# Patient Record
Sex: FEMALE | Race: WHITE | NOT HISPANIC OR LATINO
[De-identification: names, ages, dates, MRNs, and addresses within clinical notes are randomized per-mention and may not be internally consistent; named-entity substitution may affect disease eponyms.]

---

## 2017-08-03 ENCOUNTER — RESULT REVIEW (OUTPATIENT)
Age: 52
End: 2017-08-03

## 2017-08-03 ENCOUNTER — TRANSCRIPTION ENCOUNTER (OUTPATIENT)
Age: 52
End: 2017-08-03

## 2017-08-03 ENCOUNTER — OUTPATIENT (OUTPATIENT)
Dept: OUTPATIENT SERVICES | Facility: HOSPITAL | Age: 52
LOS: 1 days | Discharge: HOME | End: 2017-08-03
Payer: MEDICAID

## 2017-08-03 VITALS
HEIGHT: 65 IN | TEMPERATURE: 98 F | SYSTOLIC BLOOD PRESSURE: 113 MMHG | DIASTOLIC BLOOD PRESSURE: 65 MMHG | RESPIRATION RATE: 18 BRPM | OXYGEN SATURATION: 97 % | HEART RATE: 86 BPM | WEIGHT: 173.06 LBS

## 2017-08-03 VITALS
HEART RATE: 88 BPM | SYSTOLIC BLOOD PRESSURE: 166 MMHG | OXYGEN SATURATION: 97 % | DIASTOLIC BLOOD PRESSURE: 79 MMHG | RESPIRATION RATE: 12 BRPM

## 2017-08-03 DIAGNOSIS — Z98.1 ARTHRODESIS STATUS: Chronic | ICD-10-CM

## 2017-08-03 DIAGNOSIS — Z98.890 OTHER SPECIFIED POSTPROCEDURAL STATES: Chronic | ICD-10-CM

## 2017-08-03 DIAGNOSIS — Z96.641 PRESENCE OF RIGHT ARTIFICIAL HIP JOINT: Chronic | ICD-10-CM

## 2017-08-03 LAB — HCG UR QL: NEGATIVE — SIGNIFICANT CHANGE UP

## 2017-08-03 PROCEDURE — 88304 TISSUE EXAM BY PATHOLOGIST: CPT | Mod: 26

## 2017-08-03 RX ORDER — HYDROMORPHONE HYDROCHLORIDE 2 MG/ML
0.5 INJECTION INTRAMUSCULAR; INTRAVENOUS; SUBCUTANEOUS
Qty: 0 | Refills: 0 | Status: DISCONTINUED | OUTPATIENT
Start: 2017-08-03 | End: 2017-08-03

## 2017-08-03 RX ORDER — FENTANYL CITRATE 50 UG/ML
25 INJECTION INTRAVENOUS
Qty: 0 | Refills: 0 | Status: DISCONTINUED | OUTPATIENT
Start: 2017-08-03 | End: 2017-08-03

## 2017-08-03 RX ORDER — SODIUM CHLORIDE 9 MG/ML
1000 INJECTION, SOLUTION INTRAVENOUS
Qty: 0 | Refills: 0 | Status: DISCONTINUED | OUTPATIENT
Start: 2017-08-03 | End: 2017-08-03

## 2017-08-03 RX ORDER — ACETAMINOPHEN 500 MG
1000 TABLET ORAL ONCE
Qty: 0 | Refills: 0 | Status: COMPLETED | OUTPATIENT
Start: 2017-08-03 | End: 2017-08-03

## 2017-08-03 RX ADMIN — Medication 400 MILLIGRAM(S): at 11:04

## 2017-08-03 RX ADMIN — HYDROMORPHONE HYDROCHLORIDE 0.5 MILLIGRAM(S): 2 INJECTION INTRAMUSCULAR; INTRAVENOUS; SUBCUTANEOUS at 11:02

## 2017-08-03 RX ADMIN — HYDROMORPHONE HYDROCHLORIDE 0.5 MILLIGRAM(S): 2 INJECTION INTRAMUSCULAR; INTRAVENOUS; SUBCUTANEOUS at 11:15

## 2017-08-03 RX ADMIN — SODIUM CHLORIDE 75 MILLILITER(S): 9 INJECTION, SOLUTION INTRAVENOUS at 11:06

## 2017-08-03 RX ADMIN — Medication 1000 MILLIGRAM(S): at 11:20

## 2017-08-03 NOTE — ASU DISCHARGE PLAN (ADULT/PEDIATRIC). - MEDICATION SUMMARY - MEDICATIONS TO TAKE
I will START or STAY ON the medications listed below when I get home from the hospital:    oxyCODONE 15 mg oral tablet  --  by mouth   -- Indication: For home medication per pmd, consult primary for recomendations    methadone 10 mg oral tablet  --  by mouth   -- Indication: For home medication per pmd, consult primary for recomendations    oxyCODONE-acetaminophen 5 mg-325 mg oral tablet  -- 1 tab(s) by mouth every 4 hours, As Needed -for severe pain MDD:6 tabs per day  -- Caution federal law prohibits the transfer of this drug to any person other  than the person for whom it was prescribed.  May cause drowsiness.  Alcohol may intensify this effect.  Use care when operating dangerous machinery.  This prescription cannot be refilled.  This product contains acetaminophen.  Do not use  with any other product containing acetaminophen to prevent possible liver damage.  Using more of this medication than prescribed may cause serious breathing problems.    -- Indication: For prn severe pain    Cozaar 100 mg oral tablet  -- 1 tab(s) by mouth once a day  -- Indication: For home medication per pmd, consult primary for recomendations    Zoloft 100 mg oral tablet  -- 1 tab(s) by mouth once a day  -- Indication: For home medication per pmd, consult primary for recomendations    ZyrTEC 10 mg oral tablet  -- 1 tab(s) by mouth once a day  -- Indication: For home medication per pmd, consult primary for recomendations    Xanax 2 mg oral tablet  -- 1 tab(s) by mouth once a day, As Needed  -- Indication: For home medication per pmd, consult primary for recomendations    Ventolin HFA 90 mcg/inh inhalation aerosol  -- 2 puff(s) inhaled 4 times a day, As Needed  -- Indication: For home medication per pmd, consult primary for recomendations    Wellbutrin  mg/24 hours oral tablet, extended release  -- 1 tab(s) by mouth every 24 hours  -- Indication: For home medication per pmd, consult primary for recomendations    Synthroid 150 mcg (0.15 mg) oral tablet  -- 1 tab(s) by mouth once a day  -- Indication: For home medication per pmd, consult primary for recomendations

## 2017-08-03 NOTE — ASU DISCHARGE PLAN (ADULT/PEDIATRIC). - ITEMS TO FOLLOWUP WITH YOUR PHYSICIAN'S
please call office for follow up appointment; please rest and ice affected shoulder; sling for comfort; keep dressing clean dry intact; can replace dressing in 72 hours, place band aid over incision sites.

## 2017-08-03 NOTE — ASU PATIENT PROFILE, ADULT - PMH
Anxiety    Arthritis    Asthma    B12 deficiency    Depression    DVT (deep venous thrombosis)  right leg approx 7 years ago  Herniated lumbar intervertebral disc    HTN (hypertension)    Hypothyroid

## 2017-08-04 LAB — SURGICAL PATHOLOGY FINAL REPORT - CH: SIGNIFICANT CHANGE UP

## 2017-08-07 DIAGNOSIS — J45.909 UNSPECIFIED ASTHMA, UNCOMPLICATED: ICD-10-CM

## 2017-08-07 DIAGNOSIS — F32.9 MAJOR DEPRESSIVE DISORDER, SINGLE EPISODE, UNSPECIFIED: ICD-10-CM

## 2017-08-07 DIAGNOSIS — M75.51 BURSITIS OF RIGHT SHOULDER: ICD-10-CM

## 2017-08-07 DIAGNOSIS — I10 ESSENTIAL (PRIMARY) HYPERTENSION: ICD-10-CM

## 2017-08-07 DIAGNOSIS — M75.41 IMPINGEMENT SYNDROME OF RIGHT SHOULDER: ICD-10-CM

## 2017-08-07 DIAGNOSIS — M25.511 PAIN IN RIGHT SHOULDER: ICD-10-CM

## 2017-08-07 DIAGNOSIS — M24.111 OTHER ARTICULAR CARTILAGE DISORDERS, RIGHT SHOULDER: ICD-10-CM

## 2017-08-07 DIAGNOSIS — M75.121 COMPLETE ROTATOR CUFF TEAR OR RUPTURE OF RIGHT SHOULDER, NOT SPECIFIED AS TRAUMATIC: ICD-10-CM

## 2018-04-03 ENCOUNTER — TRANSCRIPTION ENCOUNTER (OUTPATIENT)
Age: 53
End: 2018-04-03

## 2018-04-03 RX ORDER — CELECOXIB 200 MG/1
200 CAPSULE ORAL ONCE
Qty: 0 | Refills: 0 | Status: DISCONTINUED | OUTPATIENT
Start: 2018-04-04 | End: 2018-04-04

## 2018-04-03 RX ORDER — ACETAMINOPHEN 500 MG
650 TABLET ORAL ONCE
Qty: 0 | Refills: 0 | Status: DISCONTINUED | OUTPATIENT
Start: 2018-04-04 | End: 2018-04-04

## 2018-04-03 RX ORDER — OXYCODONE HYDROCHLORIDE 5 MG/1
10 TABLET ORAL ONCE
Qty: 0 | Refills: 0 | Status: DISCONTINUED | OUTPATIENT
Start: 2018-04-04 | End: 2018-04-04

## 2018-04-04 ENCOUNTER — RESULT REVIEW (OUTPATIENT)
Age: 53
End: 2018-04-04

## 2018-04-04 ENCOUNTER — INPATIENT (INPATIENT)
Facility: HOSPITAL | Age: 53
LOS: 1 days | Discharge: HOME HEALTH | End: 2018-04-06
Attending: ORTHOPAEDIC SURGERY | Admitting: ORTHOPAEDIC SURGERY
Payer: MEDICAID

## 2018-04-04 VITALS
HEIGHT: 65 IN | RESPIRATION RATE: 16 BRPM | HEART RATE: 98 BPM | WEIGHT: 173.06 LBS | TEMPERATURE: 98 F | SYSTOLIC BLOOD PRESSURE: 128 MMHG | DIASTOLIC BLOOD PRESSURE: 76 MMHG | OXYGEN SATURATION: 97 %

## 2018-04-04 DIAGNOSIS — Z98.1 ARTHRODESIS STATUS: Chronic | ICD-10-CM

## 2018-04-04 DIAGNOSIS — Z96.641 PRESENCE OF RIGHT ARTIFICIAL HIP JOINT: Chronic | ICD-10-CM

## 2018-04-04 DIAGNOSIS — Z98.890 OTHER SPECIFIED POSTPROCEDURAL STATES: Chronic | ICD-10-CM

## 2018-04-04 DIAGNOSIS — M19.90 UNSPECIFIED OSTEOARTHRITIS, UNSPECIFIED SITE: ICD-10-CM

## 2018-04-04 LAB
ANION GAP SERPL CALC-SCNC: 6 MMOL/L — SIGNIFICANT CHANGE UP (ref 5–17)
BLD GP AB SCN SERPL QL: SIGNIFICANT CHANGE UP
BUN SERPL-MCNC: 11 MG/DL — SIGNIFICANT CHANGE UP (ref 7–23)
CALCIUM SERPL-MCNC: 7.4 MG/DL — LOW (ref 8.5–10.1)
CHLORIDE SERPL-SCNC: 109 MMOL/L — HIGH (ref 96–108)
CO2 SERPL-SCNC: 25 MMOL/L — SIGNIFICANT CHANGE UP (ref 22–31)
CREAT SERPL-MCNC: 0.76 MG/DL — SIGNIFICANT CHANGE UP (ref 0.5–1.3)
GLUCOSE SERPL-MCNC: 94 MG/DL — SIGNIFICANT CHANGE UP (ref 70–99)
HCG SERPL-ACNC: <1 MIU/ML — SIGNIFICANT CHANGE UP
HCG UR QL: NEGATIVE — SIGNIFICANT CHANGE UP
HCT VFR BLD CALC: 31.6 % — LOW (ref 34.5–45)
HGB BLD-MCNC: 10.2 G/DL — LOW (ref 11.5–15.5)
MCHC RBC-ENTMCNC: 32.2 PG — SIGNIFICANT CHANGE UP (ref 27–34)
MCHC RBC-ENTMCNC: 32.3 GM/DL — SIGNIFICANT CHANGE UP (ref 32–36)
MCV RBC AUTO: 99.7 FL — SIGNIFICANT CHANGE UP (ref 80–100)
NRBC # BLD: 0 /100 WBCS — SIGNIFICANT CHANGE UP (ref 0–0)
PLATELET # BLD AUTO: 251 K/UL — SIGNIFICANT CHANGE UP (ref 150–400)
POTASSIUM SERPL-MCNC: 3.9 MMOL/L — SIGNIFICANT CHANGE UP (ref 3.5–5.3)
POTASSIUM SERPL-SCNC: 3.9 MMOL/L — SIGNIFICANT CHANGE UP (ref 3.5–5.3)
RBC # BLD: 3.17 M/UL — LOW (ref 3.8–5.2)
RBC # FLD: 14.2 % — SIGNIFICANT CHANGE UP (ref 10.3–14.5)
SODIUM SERPL-SCNC: 140 MMOL/L — SIGNIFICANT CHANGE UP (ref 135–145)
WBC # BLD: 13.65 K/UL — HIGH (ref 3.8–10.5)
WBC # FLD AUTO: 13.65 K/UL — HIGH (ref 3.8–10.5)

## 2018-04-04 PROCEDURE — 88311 DECALCIFY TISSUE: CPT | Mod: 26

## 2018-04-04 PROCEDURE — 88305 TISSUE EXAM BY PATHOLOGIST: CPT | Mod: 26

## 2018-04-04 PROCEDURE — 72170 X-RAY EXAM OF PELVIS: CPT | Mod: 26

## 2018-04-04 RX ORDER — ALBUTEROL 90 UG/1
2 AEROSOL, METERED ORAL EVERY 4 HOURS
Qty: 0 | Refills: 0 | Status: DISCONTINUED | OUTPATIENT
Start: 2018-04-04 | End: 2018-04-06

## 2018-04-04 RX ORDER — ALPRAZOLAM 0.25 MG
2 TABLET ORAL DAILY
Qty: 0 | Refills: 0 | Status: DISCONTINUED | OUTPATIENT
Start: 2018-04-04 | End: 2018-04-06

## 2018-04-04 RX ORDER — OXYCODONE HYDROCHLORIDE 5 MG/1
5 TABLET ORAL EVERY 4 HOURS
Qty: 0 | Refills: 0 | Status: DISCONTINUED | OUTPATIENT
Start: 2018-04-04 | End: 2018-04-05

## 2018-04-04 RX ORDER — OXYCODONE HYDROCHLORIDE 5 MG/1
0 TABLET ORAL
Qty: 0 | Refills: 0 | COMMUNITY

## 2018-04-04 RX ORDER — ACETAMINOPHEN 500 MG
1000 TABLET ORAL ONCE
Qty: 0 | Refills: 0 | Status: COMPLETED | OUTPATIENT
Start: 2018-04-04 | End: 2018-04-04

## 2018-04-04 RX ORDER — HYDROMORPHONE HYDROCHLORIDE 2 MG/ML
1 INJECTION INTRAMUSCULAR; INTRAVENOUS; SUBCUTANEOUS EVERY 4 HOURS
Qty: 0 | Refills: 0 | Status: DISCONTINUED | OUTPATIENT
Start: 2018-04-04 | End: 2018-04-04

## 2018-04-04 RX ORDER — LOSARTAN POTASSIUM 100 MG/1
100 TABLET, FILM COATED ORAL DAILY
Qty: 0 | Refills: 0 | Status: DISCONTINUED | OUTPATIENT
Start: 2018-04-04 | End: 2018-04-06

## 2018-04-04 RX ORDER — FERROUS SULFATE 325(65) MG
325 TABLET ORAL
Qty: 0 | Refills: 0 | Status: DISCONTINUED | OUTPATIENT
Start: 2018-04-04 | End: 2018-04-06

## 2018-04-04 RX ORDER — FAMOTIDINE 10 MG/ML
20 INJECTION INTRAVENOUS EVERY 12 HOURS
Qty: 0 | Refills: 0 | Status: DISCONTINUED | OUTPATIENT
Start: 2018-04-04 | End: 2018-04-06

## 2018-04-04 RX ORDER — ACETAMINOPHEN 500 MG
650 TABLET ORAL EVERY 6 HOURS
Qty: 0 | Refills: 0 | Status: DISCONTINUED | OUTPATIENT
Start: 2018-04-04 | End: 2018-04-06

## 2018-04-04 RX ORDER — INFLUENZA VIRUS VACCINE 15; 15; 15; 15 UG/.5ML; UG/.5ML; UG/.5ML; UG/.5ML
0.5 SUSPENSION INTRAMUSCULAR ONCE
Qty: 0 | Refills: 0 | Status: DISCONTINUED | OUTPATIENT
Start: 2018-04-04 | End: 2018-04-06

## 2018-04-04 RX ORDER — DIPHENHYDRAMINE HCL 50 MG
50 CAPSULE ORAL EVERY 4 HOURS
Qty: 0 | Refills: 0 | Status: DISCONTINUED | OUTPATIENT
Start: 2018-04-04 | End: 2018-04-06

## 2018-04-04 RX ORDER — SODIUM CHLORIDE 9 MG/ML
1000 INJECTION, SOLUTION INTRAVENOUS
Qty: 0 | Refills: 0 | Status: DISCONTINUED | OUTPATIENT
Start: 2018-04-04 | End: 2018-04-05

## 2018-04-04 RX ORDER — LORATADINE 10 MG/1
10 TABLET ORAL DAILY
Qty: 0 | Refills: 0 | Status: DISCONTINUED | OUTPATIENT
Start: 2018-04-04 | End: 2018-04-06

## 2018-04-04 RX ORDER — ASPIRIN/CALCIUM CARB/MAGNESIUM 324 MG
325 TABLET ORAL
Qty: 0 | Refills: 0 | Status: DISCONTINUED | OUTPATIENT
Start: 2018-04-05 | End: 2018-04-06

## 2018-04-04 RX ORDER — BUPROPION HYDROCHLORIDE 150 MG/1
300 TABLET, EXTENDED RELEASE ORAL DAILY
Qty: 0 | Refills: 0 | Status: DISCONTINUED | OUTPATIENT
Start: 2018-04-04 | End: 2018-04-06

## 2018-04-04 RX ORDER — DOCUSATE SODIUM 100 MG
100 CAPSULE ORAL THREE TIMES A DAY
Qty: 0 | Refills: 0 | Status: DISCONTINUED | OUTPATIENT
Start: 2018-04-04 | End: 2018-04-06

## 2018-04-04 RX ORDER — BENZOCAINE AND MENTHOL 5; 1 G/100ML; G/100ML
1 LIQUID ORAL
Qty: 0 | Refills: 0 | Status: DISCONTINUED | OUTPATIENT
Start: 2018-04-04 | End: 2018-04-06

## 2018-04-04 RX ORDER — SODIUM CHLORIDE 9 MG/ML
1000 INJECTION, SOLUTION INTRAVENOUS
Qty: 0 | Refills: 0 | Status: DISCONTINUED | OUTPATIENT
Start: 2018-04-04 | End: 2018-04-04

## 2018-04-04 RX ORDER — ASCORBIC ACID 60 MG
500 TABLET,CHEWABLE ORAL
Qty: 0 | Refills: 0 | Status: DISCONTINUED | OUTPATIENT
Start: 2018-04-04 | End: 2018-04-06

## 2018-04-04 RX ORDER — HYDROMORPHONE HYDROCHLORIDE 2 MG/ML
1.5 INJECTION INTRAMUSCULAR; INTRAVENOUS; SUBCUTANEOUS
Qty: 0 | Refills: 0 | Status: DISCONTINUED | OUTPATIENT
Start: 2018-04-04 | End: 2018-04-06

## 2018-04-04 RX ORDER — ZOLPIDEM TARTRATE 10 MG/1
5 TABLET ORAL AT BEDTIME
Qty: 0 | Refills: 0 | Status: DISCONTINUED | OUTPATIENT
Start: 2018-04-04 | End: 2018-04-06

## 2018-04-04 RX ORDER — SERTRALINE 25 MG/1
200 TABLET, FILM COATED ORAL AT BEDTIME
Qty: 0 | Refills: 0 | Status: DISCONTINUED | OUTPATIENT
Start: 2018-04-04 | End: 2018-04-06

## 2018-04-04 RX ORDER — VANCOMYCIN HCL 1 G
1000 VIAL (EA) INTRAVENOUS ONCE
Qty: 0 | Refills: 0 | Status: COMPLETED | OUTPATIENT
Start: 2018-04-04 | End: 2018-04-04

## 2018-04-04 RX ORDER — SENNA PLUS 8.6 MG/1
2 TABLET ORAL AT BEDTIME
Qty: 0 | Refills: 0 | Status: DISCONTINUED | OUTPATIENT
Start: 2018-04-04 | End: 2018-04-06

## 2018-04-04 RX ORDER — ONDANSETRON 8 MG/1
4 TABLET, FILM COATED ORAL EVERY 6 HOURS
Qty: 0 | Refills: 0 | Status: DISCONTINUED | OUTPATIENT
Start: 2018-04-04 | End: 2018-04-06

## 2018-04-04 RX ORDER — METHADONE HYDROCHLORIDE 40 MG/1
0 TABLET ORAL
Qty: 0 | Refills: 0 | COMMUNITY

## 2018-04-04 RX ORDER — OXYCODONE HYDROCHLORIDE 5 MG/1
10 TABLET ORAL EVERY 4 HOURS
Qty: 0 | Refills: 0 | Status: DISCONTINUED | OUTPATIENT
Start: 2018-04-04 | End: 2018-04-05

## 2018-04-04 RX ORDER — LEVOTHYROXINE SODIUM 125 MCG
150 TABLET ORAL DAILY
Qty: 0 | Refills: 0 | Status: DISCONTINUED | OUTPATIENT
Start: 2018-04-04 | End: 2018-04-06

## 2018-04-04 RX ORDER — HYDROMORPHONE HYDROCHLORIDE 2 MG/ML
1 INJECTION INTRAMUSCULAR; INTRAVENOUS; SUBCUTANEOUS ONCE
Qty: 0 | Refills: 0 | Status: DISCONTINUED | OUTPATIENT
Start: 2018-04-04 | End: 2018-04-04

## 2018-04-04 RX ORDER — FOLIC ACID 0.8 MG
1 TABLET ORAL DAILY
Qty: 0 | Refills: 0 | Status: DISCONTINUED | OUTPATIENT
Start: 2018-04-04 | End: 2018-04-06

## 2018-04-04 RX ORDER — OXYCODONE HYDROCHLORIDE 5 MG/1
1 TABLET ORAL
Qty: 0 | Refills: 0 | COMMUNITY

## 2018-04-04 RX ADMIN — HYDROMORPHONE HYDROCHLORIDE 1 MILLIGRAM(S): 2 INJECTION INTRAMUSCULAR; INTRAVENOUS; SUBCUTANEOUS at 10:59

## 2018-04-04 RX ADMIN — HYDROMORPHONE HYDROCHLORIDE 1 MILLIGRAM(S): 2 INJECTION INTRAMUSCULAR; INTRAVENOUS; SUBCUTANEOUS at 11:13

## 2018-04-04 RX ADMIN — HYDROMORPHONE HYDROCHLORIDE 1.5 MILLIGRAM(S): 2 INJECTION INTRAMUSCULAR; INTRAVENOUS; SUBCUTANEOUS at 17:20

## 2018-04-04 RX ADMIN — Medication 650 MILLIGRAM(S): at 07:20

## 2018-04-04 RX ADMIN — CELECOXIB 200 MILLIGRAM(S): 200 CAPSULE ORAL at 07:20

## 2018-04-04 RX ADMIN — HYDROMORPHONE HYDROCHLORIDE 1.5 MILLIGRAM(S): 2 INJECTION INTRAMUSCULAR; INTRAVENOUS; SUBCUTANEOUS at 16:41

## 2018-04-04 RX ADMIN — SODIUM CHLORIDE 75 MILLILITER(S): 9 INJECTION, SOLUTION INTRAVENOUS at 11:00

## 2018-04-04 RX ADMIN — OXYCODONE HYDROCHLORIDE 10 MILLIGRAM(S): 5 TABLET ORAL at 15:06

## 2018-04-04 RX ADMIN — HYDROMORPHONE HYDROCHLORIDE 1 MILLIGRAM(S): 2 INJECTION INTRAMUSCULAR; INTRAVENOUS; SUBCUTANEOUS at 13:59

## 2018-04-04 RX ADMIN — OXYCODONE HYDROCHLORIDE 10 MILLIGRAM(S): 5 TABLET ORAL at 16:00

## 2018-04-04 RX ADMIN — Medication 250 MILLIGRAM(S): at 13:16

## 2018-04-04 RX ADMIN — OXYCODONE HYDROCHLORIDE 10 MILLIGRAM(S): 5 TABLET ORAL at 20:45

## 2018-04-04 RX ADMIN — HYDROMORPHONE HYDROCHLORIDE 1.5 MILLIGRAM(S): 2 INJECTION INTRAMUSCULAR; INTRAVENOUS; SUBCUTANEOUS at 20:20

## 2018-04-04 RX ADMIN — OXYCODONE HYDROCHLORIDE 10 MILLIGRAM(S): 5 TABLET ORAL at 21:45

## 2018-04-04 RX ADMIN — Medication 2 MILLIGRAM(S): at 21:33

## 2018-04-04 RX ADMIN — Medication 400 MILLIGRAM(S): at 14:45

## 2018-04-04 RX ADMIN — HYDROMORPHONE HYDROCHLORIDE 1.5 MILLIGRAM(S): 2 INJECTION INTRAMUSCULAR; INTRAVENOUS; SUBCUTANEOUS at 19:31

## 2018-04-04 RX ADMIN — SERTRALINE 200 MILLIGRAM(S): 25 TABLET, FILM COATED ORAL at 23:53

## 2018-04-04 RX ADMIN — HYDROMORPHONE HYDROCHLORIDE 1 MILLIGRAM(S): 2 INJECTION INTRAMUSCULAR; INTRAVENOUS; SUBCUTANEOUS at 13:16

## 2018-04-04 NOTE — H&P ADULT - NSHPPHYSICALEXAM_GEN_ALL_CORE
· Constitutional	Well-developed, well nourished  · Respiratory - Breath Sounds equal & clear to percussion & auscultation, no accessory muscle use  · Cardiovascular - Regular rate & rhythm, normal S1, S2; no murmurs, gallops or rubs; no S3, S4  · Gastrointestinal - Soft, non-tender, no hepatosplenomegaly, normal bowel sounds  · Extremities - No cyanosis, clubbing or edema  · Vascular - Equal and normal pulses (carotid, femoral, dorsalis pedis)  · Neurological - Alert & oriented; no sensory, motor or coordination deficits, normal reflexes  · Skin - No lesions; no rash  · Musculoskeletal - decreased ROM due to pain; left shoulder  · Psychiatric -Affect and characteristics of appearance, verbalizations, behaviors are appropriate

## 2018-04-04 NOTE — PHYSICAL THERAPY INITIAL EVALUATION ADULT - PASSIVE RANGE OF MOTION EXAMINATION, REHAB EVAL
Right LE Passive ROM was WNL (within normal limits)/L hip no adduction past neutral, no internal rotation, no flexion past 90

## 2018-04-04 NOTE — PHYSICAL THERAPY INITIAL EVALUATION ADULT - ADDITIONAL COMMENTS
Pt had right hip 4-5 years ago. Pt lives in an apartment with 1 step To enter building no handrails. Pt states her boyfriend will help assist. Pt's boyfriend will help once pt is home on and off when he is not at work.

## 2018-04-04 NOTE — PHYSICAL THERAPY INITIAL EVALUATION ADULT - GAIT DEVIATIONS NOTED, PT EVAL
decreased step length/increased time in double stance/decreased stride length/decreased weight-shifting ability/decreased epifanio

## 2018-04-04 NOTE — PHYSICAL THERAPY INITIAL EVALUATION ADULT - MODIFIED CLINICAL TEST OF SENSORY INTEGRATION IN BALANCE TEST
Barthel Index: Feeding Score _10__, Bathing Score _0__, Grooming Score _5__, Dressing Score __5_, Bowels Score _10__, Bladder Score 10___, Toilet Score _5_, Transfers Score __5_, Mobility Score _0__, Stairs Score _0__,     Total Score __50_

## 2018-04-04 NOTE — H&P ADULT - HISTORY OF PRESENT ILLNESS
52 year old female c/o left hip pain. Failed outpatient conservative management, now presenting for left total hip arthroplasty.

## 2018-04-04 NOTE — ASU PREOP CHECKLIST - MUPIRONCIN COMMENTS
hibiclens not available since patient did not come for presurgical hibiclens not available since patient did not come for presurgical - pt took mupirocin for 5 days

## 2018-04-04 NOTE — H&P ADULT - NSHPREVIEWOFSYSTEMS_GEN_ALL_CORE
CONSTITUTIONAL: No fever or chills  HEENT:  No headache, no sore throat  RESPIRATORY: No cough, wheezing, or shortness of breath  CARDIOVASCULAR: No chest pain, palpitations, or leg swelling  GASTROINTESTINAL: No nausea, vomiting, or diarrhea  GENITOURINARY: No dysuria, frequency, or hematuria  NEUROLOGICAL: no focal weakness or dizziness  SKIN:  No rashes or lesions   MUSCULOSKELETAL: no myalgias   PSYCHIATRIC: No depression or anxiety

## 2018-04-04 NOTE — PROGRESS NOTE ADULT - SUBJECTIVE AND OBJECTIVE BOX
Post-Op Check:    Pt S/E at bedside, tolerated procedure well. Pt complaining of pain, worse at L hip but also present in back and BL shoulders as pt has Hx of multiple orthopedic surgeries and on daily opioids at home.    Vital Signs Last 24 Hrs  T(C): 36.4 (04 Apr 2018 12:37), Max: 36.9 (04 Apr 2018 06:48)  T(F): 97.6 (04 Apr 2018 12:37), Max: 98.5 (04 Apr 2018 11:39)  HR: 93 (04 Apr 2018 12:37) (70 - 98)  BP: 126/65 (04 Apr 2018 12:58) (104/41 - 146/77)  RR: 18 (04 Apr 2018 12:37) (12 - 25)  SpO2: 98% (04 Apr 2018 12:37) (97% - 100%)    Gen: NAD, AAOx3    Left Lower Extremity:  Dressing clean dry intact  HMV in place  +EHL/FHL/TA/GS  SILT L3-S1  +DP/PT Pulses  Compartments soft  No calf TTP B/L

## 2018-04-04 NOTE — BRIEF OPERATIVE NOTE - PROCEDURE
<<-----Click on this checkbox to enter Procedure Arthroplasty, hip, total  04/04/2018    Active  [id number]

## 2018-04-04 NOTE — PROGRESS NOTE ADULT - ASSESSMENT
A/P: 52F w/ L hip OA s/p L JEREMIAS POD0  Pain Control  DVT ppx beginning 4/5  WBAT  PT/OT  Monitor HMV  Abduction pillow  Posterior hip precautions  Incentive Spirometry  DC planning

## 2018-04-05 ENCOUNTER — TRANSCRIPTION ENCOUNTER (OUTPATIENT)
Age: 53
End: 2018-04-05

## 2018-04-05 LAB
ANION GAP SERPL CALC-SCNC: 4 MMOL/L — LOW (ref 5–17)
BUN SERPL-MCNC: 13 MG/DL — SIGNIFICANT CHANGE UP (ref 7–23)
CALCIUM SERPL-MCNC: 7.2 MG/DL — LOW (ref 8.5–10.1)
CHLORIDE SERPL-SCNC: 107 MMOL/L — SIGNIFICANT CHANGE UP (ref 96–108)
CO2 SERPL-SCNC: 28 MMOL/L — SIGNIFICANT CHANGE UP (ref 22–31)
CREAT SERPL-MCNC: 0.62 MG/DL — SIGNIFICANT CHANGE UP (ref 0.5–1.3)
GLUCOSE SERPL-MCNC: 99 MG/DL — SIGNIFICANT CHANGE UP (ref 70–99)
HCT VFR BLD CALC: 24.9 % — LOW (ref 34.5–45)
HGB BLD-MCNC: 8.2 G/DL — LOW (ref 11.5–15.5)
MCHC RBC-ENTMCNC: 32.9 GM/DL — SIGNIFICANT CHANGE UP (ref 32–36)
MCHC RBC-ENTMCNC: 33.5 PG — SIGNIFICANT CHANGE UP (ref 27–34)
MCV RBC AUTO: 101.6 FL — HIGH (ref 80–100)
NRBC # BLD: 0 /100 WBCS — SIGNIFICANT CHANGE UP (ref 0–0)
PLATELET # BLD AUTO: 239 K/UL — SIGNIFICANT CHANGE UP (ref 150–400)
POTASSIUM SERPL-MCNC: 3.7 MMOL/L — SIGNIFICANT CHANGE UP (ref 3.5–5.3)
POTASSIUM SERPL-SCNC: 3.7 MMOL/L — SIGNIFICANT CHANGE UP (ref 3.5–5.3)
RBC # BLD: 2.45 M/UL — LOW (ref 3.8–5.2)
RBC # FLD: 14.2 % — SIGNIFICANT CHANGE UP (ref 10.3–14.5)
SODIUM SERPL-SCNC: 139 MMOL/L — SIGNIFICANT CHANGE UP (ref 135–145)
WBC # BLD: 9.95 K/UL — SIGNIFICANT CHANGE UP (ref 3.8–10.5)
WBC # FLD AUTO: 9.95 K/UL — SIGNIFICANT CHANGE UP (ref 3.8–10.5)

## 2018-04-05 RX ORDER — ACETAMINOPHEN 500 MG
1000 TABLET ORAL ONCE
Qty: 0 | Refills: 0 | Status: DISCONTINUED | OUTPATIENT
Start: 2018-04-05 | End: 2018-04-05

## 2018-04-05 RX ORDER — ACETAMINOPHEN 500 MG
1000 TABLET ORAL ONCE
Qty: 0 | Refills: 0 | Status: COMPLETED | OUTPATIENT
Start: 2018-04-05 | End: 2018-04-05

## 2018-04-05 RX ORDER — OXYCODONE HYDROCHLORIDE 5 MG/1
10 TABLET ORAL EVERY 12 HOURS
Qty: 0 | Refills: 0 | Status: DISCONTINUED | OUTPATIENT
Start: 2018-04-05 | End: 2018-04-05

## 2018-04-05 RX ORDER — HYDROMORPHONE HYDROCHLORIDE 2 MG/ML
2 INJECTION INTRAMUSCULAR; INTRAVENOUS; SUBCUTANEOUS ONCE
Qty: 0 | Refills: 0 | Status: DISCONTINUED | OUTPATIENT
Start: 2018-04-05 | End: 2018-04-05

## 2018-04-05 RX ORDER — ASPIRIN/CALCIUM CARB/MAGNESIUM 324 MG
1 TABLET ORAL
Qty: 60 | Refills: 0 | OUTPATIENT
Start: 2018-04-05 | End: 2018-05-04

## 2018-04-05 RX ORDER — HYDROMORPHONE HYDROCHLORIDE 2 MG/ML
2 INJECTION INTRAMUSCULAR; INTRAVENOUS; SUBCUTANEOUS EVERY 4 HOURS
Qty: 0 | Refills: 0 | Status: DISCONTINUED | OUTPATIENT
Start: 2018-04-05 | End: 2018-04-06

## 2018-04-05 RX ORDER — OXYCODONE HYDROCHLORIDE 5 MG/1
10 TABLET ORAL EVERY 12 HOURS
Qty: 0 | Refills: 0 | Status: DISCONTINUED | OUTPATIENT
Start: 2018-04-05 | End: 2018-04-06

## 2018-04-05 RX ORDER — HYDROMORPHONE HYDROCHLORIDE 2 MG/ML
4 INJECTION INTRAMUSCULAR; INTRAVENOUS; SUBCUTANEOUS EVERY 4 HOURS
Qty: 0 | Refills: 0 | Status: DISCONTINUED | OUTPATIENT
Start: 2018-04-05 | End: 2018-04-06

## 2018-04-05 RX ADMIN — SODIUM CHLORIDE 75 MILLILITER(S): 9 INJECTION, SOLUTION INTRAVENOUS at 00:16

## 2018-04-05 RX ADMIN — LORATADINE 10 MILLIGRAM(S): 10 TABLET ORAL at 11:36

## 2018-04-05 RX ADMIN — HYDROMORPHONE HYDROCHLORIDE 1.5 MILLIGRAM(S): 2 INJECTION INTRAMUSCULAR; INTRAVENOUS; SUBCUTANEOUS at 21:22

## 2018-04-05 RX ADMIN — SERTRALINE 200 MILLIGRAM(S): 25 TABLET, FILM COATED ORAL at 21:12

## 2018-04-05 RX ADMIN — HYDROMORPHONE HYDROCHLORIDE 1.5 MILLIGRAM(S): 2 INJECTION INTRAMUSCULAR; INTRAVENOUS; SUBCUTANEOUS at 04:39

## 2018-04-05 RX ADMIN — HYDROMORPHONE HYDROCHLORIDE 1.5 MILLIGRAM(S): 2 INJECTION INTRAMUSCULAR; INTRAVENOUS; SUBCUTANEOUS at 08:00

## 2018-04-05 RX ADMIN — Medication 1000 MILLIGRAM(S): at 11:40

## 2018-04-05 RX ADMIN — Medication 325 MILLIGRAM(S): at 06:03

## 2018-04-05 RX ADMIN — HYDROMORPHONE HYDROCHLORIDE 2 MILLIGRAM(S): 2 INJECTION INTRAMUSCULAR; INTRAVENOUS; SUBCUTANEOUS at 12:40

## 2018-04-05 RX ADMIN — Medication 500 MILLIGRAM(S): at 06:03

## 2018-04-05 RX ADMIN — Medication 325 MILLIGRAM(S): at 19:07

## 2018-04-05 RX ADMIN — OXYCODONE HYDROCHLORIDE 10 MILLIGRAM(S): 5 TABLET ORAL at 19:07

## 2018-04-05 RX ADMIN — HYDROMORPHONE HYDROCHLORIDE 1.5 MILLIGRAM(S): 2 INJECTION INTRAMUSCULAR; INTRAVENOUS; SUBCUTANEOUS at 21:07

## 2018-04-05 RX ADMIN — OXYCODONE HYDROCHLORIDE 10 MILLIGRAM(S): 5 TABLET ORAL at 11:23

## 2018-04-05 RX ADMIN — OXYCODONE HYDROCHLORIDE 10 MILLIGRAM(S): 5 TABLET ORAL at 02:50

## 2018-04-05 RX ADMIN — HYDROMORPHONE HYDROCHLORIDE 4 MILLIGRAM(S): 2 INJECTION INTRAMUSCULAR; INTRAVENOUS; SUBCUTANEOUS at 17:13

## 2018-04-05 RX ADMIN — Medication 325 MILLIGRAM(S): at 19:08

## 2018-04-05 RX ADMIN — HYDROMORPHONE HYDROCHLORIDE 1.5 MILLIGRAM(S): 2 INJECTION INTRAMUSCULAR; INTRAVENOUS; SUBCUTANEOUS at 04:50

## 2018-04-05 RX ADMIN — FAMOTIDINE 20 MILLIGRAM(S): 10 INJECTION INTRAVENOUS at 06:03

## 2018-04-05 RX ADMIN — OXYCODONE HYDROCHLORIDE 10 MILLIGRAM(S): 5 TABLET ORAL at 12:12

## 2018-04-05 RX ADMIN — HYDROMORPHONE HYDROCHLORIDE 4 MILLIGRAM(S): 2 INJECTION INTRAMUSCULAR; INTRAVENOUS; SUBCUTANEOUS at 16:14

## 2018-04-05 RX ADMIN — HYDROMORPHONE HYDROCHLORIDE 1.5 MILLIGRAM(S): 2 INJECTION INTRAMUSCULAR; INTRAVENOUS; SUBCUTANEOUS at 00:17

## 2018-04-05 RX ADMIN — Medication 500 MILLIGRAM(S): at 19:09

## 2018-04-05 RX ADMIN — Medication 100 MILLIGRAM(S): at 06:09

## 2018-04-05 RX ADMIN — OXYCODONE HYDROCHLORIDE 10 MILLIGRAM(S): 5 TABLET ORAL at 06:45

## 2018-04-05 RX ADMIN — OXYCODONE HYDROCHLORIDE 10 MILLIGRAM(S): 5 TABLET ORAL at 19:44

## 2018-04-05 RX ADMIN — BUPROPION HYDROCHLORIDE 300 MILLIGRAM(S): 150 TABLET, EXTENDED RELEASE ORAL at 11:27

## 2018-04-05 RX ADMIN — FAMOTIDINE 20 MILLIGRAM(S): 10 INJECTION INTRAVENOUS at 19:08

## 2018-04-05 RX ADMIN — Medication 325 MILLIGRAM(S): at 12:25

## 2018-04-05 RX ADMIN — Medication 325 MILLIGRAM(S): at 09:39

## 2018-04-05 RX ADMIN — HYDROMORPHONE HYDROCHLORIDE 4 MILLIGRAM(S): 2 INJECTION INTRAMUSCULAR; INTRAVENOUS; SUBCUTANEOUS at 23:13

## 2018-04-05 RX ADMIN — HYDROMORPHONE HYDROCHLORIDE 1.5 MILLIGRAM(S): 2 INJECTION INTRAMUSCULAR; INTRAVENOUS; SUBCUTANEOUS at 07:42

## 2018-04-05 RX ADMIN — HYDROMORPHONE HYDROCHLORIDE 1.5 MILLIGRAM(S): 2 INJECTION INTRAMUSCULAR; INTRAVENOUS; SUBCUTANEOUS at 00:32

## 2018-04-05 RX ADMIN — Medication 1 TABLET(S): at 11:25

## 2018-04-05 RX ADMIN — Medication 150 MICROGRAM(S): at 06:03

## 2018-04-05 RX ADMIN — Medication 1 MILLIGRAM(S): at 11:26

## 2018-04-05 RX ADMIN — OXYCODONE HYDROCHLORIDE 10 MILLIGRAM(S): 5 TABLET ORAL at 06:09

## 2018-04-05 RX ADMIN — HYDROMORPHONE HYDROCHLORIDE 2 MILLIGRAM(S): 2 INJECTION INTRAMUSCULAR; INTRAVENOUS; SUBCUTANEOUS at 12:24

## 2018-04-05 RX ADMIN — LOSARTAN POTASSIUM 100 MILLIGRAM(S): 100 TABLET, FILM COATED ORAL at 06:03

## 2018-04-05 RX ADMIN — OXYCODONE HYDROCHLORIDE 10 MILLIGRAM(S): 5 TABLET ORAL at 01:56

## 2018-04-05 RX ADMIN — Medication 400 MILLIGRAM(S): at 11:23

## 2018-04-05 NOTE — DISCHARGE NOTE ADULT - MEDICATION SUMMARY - MEDICATIONS TO TAKE
I will START or STAY ON the medications listed below when I get home from the hospital:    aspirin 325 mg oral tablet  -- 1 tab(s) by mouth 2 times a day for DVT prophylaxis  -- Take with food or milk.    -- Indication: For dvt prophylaxis    oxyCODONE-acetaminophen 10 mg-325 mg oral tablet  -- 1 tab(s) by mouth every 4 hours, As Needed for pain MDD:6   -- Caution federal law prohibits the transfer of this drug to any person other  than the person for whom it was prescribed.  May cause drowsiness.  Alcohol may intensify this effect.  Use care when operating dangerous machinery.  This prescription cannot be refilled.  This product contains acetaminophen.  Do not use  with any other product containing acetaminophen to prevent possible liver damage.  Using more of this medication than prescribed may cause serious breathing problems.    -- Indication: For pain    Cozaar 100 mg oral tablet  -- 1 tab(s) by mouth once a day  -- Indication: For prior home med    Zoloft 100 mg oral tablet  -- 2 tab(s) by mouth prn  -- Indication: For prior home med    ZyrTEC 10 mg oral tablet  -- 1 tab(s) by mouth once a day  -- Indication: For prior home med    Xanax 2 mg oral tablet  -- 1 tab(s) by mouth once a day, As Needed  -- Indication: For prior home med    Ventolin HFA 90 mcg/inh inhalation aerosol  -- 2 puff(s) inhaled 4 times a day, As Needed  -- Indication: For prior home med    Wellbutrin  mg/24 hours oral tablet, extended release  -- 1 tab(s) by mouth every 24 hours  -- Indication: For prior home med    Synthroid 150 mcg (0.15 mg) oral tablet  -- 1 tab(s) by mouth once a day  -- Indication: For prior home med I will START or STAY ON the medications listed below when I get home from the hospital:    Dilaudid 2 mg oral tablet  -- 1 tab(s) by mouth every 4 to 6 hours as needed for pain MDD:6  -- Caution federal law prohibits the transfer of this drug to any person other  than the person for whom it was prescribed.  May cause drowsiness.  Alcohol may intensify this effect.  Use care when operating dangerous machinery.  This prescription cannot be refilled.  Using more of this medication than prescribed may cause serious breathing problems.    -- Indication: For pain    aspirin 325 mg oral tablet  -- 1 tab(s) by mouth 2 times a day for DVT prophylaxis  -- Take with food or milk.    -- Indication: For dvt prophylaxis    Cozaar 100 mg oral tablet  -- 1 tab(s) by mouth once a day  -- Indication: For prior home med    Zoloft 100 mg oral tablet  -- 2 tab(s) by mouth prn  -- Indication: For prior home med    ZyrTEC 10 mg oral tablet  -- 1 tab(s) by mouth once a day  -- Indication: For prior home med    Xanax 2 mg oral tablet  -- 1 tab(s) by mouth once a day, As Needed  -- Indication: For prior home med    Ventolin HFA 90 mcg/inh inhalation aerosol  -- 2 puff(s) inhaled 4 times a day, As Needed  -- Indication: For prior home med    Wellbutrin  mg/24 hours oral tablet, extended release  -- 1 tab(s) by mouth every 24 hours  -- Indication: For prior home med    Synthroid 150 mcg (0.15 mg) oral tablet  -- 1 tab(s) by mouth once a day  -- Indication: For prior home med

## 2018-04-05 NOTE — OCCUPATIONAL THERAPY INITIAL EVALUATION ADULT - PERTINENT HX OF CURRENT PROBLEM, REHAB EVAL
Pt was admitted for elective surgery for left posterior total hip replacement due to pain, arthritis and DJD.

## 2018-04-05 NOTE — DISCHARGE NOTE ADULT - HOSPITAL COURSE
The patient is a 52 year old female status post elective Total Hip Arthroplasty to the left hip after failing outpatient non-operative conservative management.  Patient presented to [facility] after being medically cleared for an elective surgical procedure. The patient was taken to the operating room on date mentioned above. Prophylactic antibiotics were started before the procedure and continued for 24 hours.  There were no complications during the procedure and patient tolerated the procedure well.  The patient was transferred to recovery room in stable condition and subsequently to surgical floor.  Patient was placed aspirin for anticoagulation.  All home medications were continued.  The patient received physical therapy daily and daily labs were followed.  The dressing was kept clean, dry, intact.  The rest of the hospital stay was unremarkable.

## 2018-04-05 NOTE — OCCUPATIONAL THERAPY INITIAL EVALUATION ADULT - ADDITIONAL COMMENTS
Prior to admission, pt was functioning in her roles, self sufficient, driving & ambulating independently. Presently, pt needs assistance with lower body functional tasks and functional mobility. Pt is right hand dominant. The scale below depicts a picture of the pt's current level of functioning.  Barthel Index: Feeding score: 10 Bathing Score: 0 Grooming Score:  0Dressing Score:5  Bowel Score: 10 Bladder Score: 10 Toilet Score: 5 Transfer Score: 5  Mobility Score: 10 Stairs Score: 0 Total Score:55/100.

## 2018-04-05 NOTE — OCCUPATIONAL THERAPY INITIAL EVALUATION ADULT - LIVES WITH, PROFILE
alone/in an apt with 1 entry step and an elevator to reach her floor. Pt has a shower/tub combination and no assistive devices.

## 2018-04-05 NOTE — OCCUPATIONAL THERAPY INITIAL EVALUATION ADULT - ANTICIPATED DISCHARGE DISPOSITION, OT EVAL
home w/ OT Recommend home with hip kit, 3-in-1 commode and OT referral to enable patient to safely perform ADL management and functional mobility. Recommend that [facility] assess pt's bathroom for appropriate shower chair.

## 2018-04-05 NOTE — DISCHARGE NOTE ADULT - CARE PLAN
Principal Discharge DX:	S/P total hip arthroplasty  Goal:	Return to baseline ADLs  Assessment and plan of treatment:	1. Pain control  2. Walking with full weight bearing as tolerated with posterior hip precautions, with assistive devices (walker/cane) as needed  3. DVT Prophylaxis for 30 days with aspirin 325mg twice a day  4. Physical therapy  5. Follow up with Dr. De Jesus as outpatient in 10-14 days after discharge from the hospital or rehab. Call office for appointment.  6. Remove staples/sutures Post-Op Day 14, and remove dressing Post-Op Day 10 with daily dressing changes as needed.  7. Ice affected area as needed.  8. Keep dressing clean and dry. Principal Discharge DX:	S/P total hip arthroplasty  Goal:	Return to baseline ADLs  Assessment and plan of treatment:	1. Pain control  2. Walking with full weight bearing as tolerated with posterior hip precautions, with assistive devices (walker/cane) as needed  3. DVT Prophylaxis for 30 days with aspirin 325mg twice a day  4. Physical therapy  5. Follow up with Dr. De Jesus as outpatient in 10-14 days after discharge from the hospital or rehab. Call office for appointment.  6. Remove staples/sutures Post-Op Day 14, and remove dressing Post-Op Day 10 with daily dressing changes as needed.  7. Ice affected area as needed.  8. Keep dressing clean and dry. Remove TITO dressing at post-operative Day 7.

## 2018-04-05 NOTE — DISCHARGE NOTE ADULT - NSFTFSERV1RD_GEN_ALL_CORE
observation and assessment/rehabilitation services rehabilitation services/teaching and training/observation and assessment/medication teaching and assessment

## 2018-04-05 NOTE — PROGRESS NOTE ADULT - SUBJECTIVE AND OBJECTIVE BOX
Pt c/o L leg pain    Afeb/VSS      H/H  8.2/24    PE L LE  dssg c/d/i  NSLT distal  +DF/PF ankle  2+DP pulse    Imp L JEREMIAS    Plan  1. WBAT L LE  2. pain control/pt may go home on dilaudid with f/u with pain management  3. d/c planning    Yunior De Jesus  4:24 pm

## 2018-04-05 NOTE — PROGRESS NOTE ADULT - ASSESSMENT
A/P: 52F w/ L hip OA s/p L JEREMIAS POD1  Pain Control  DVT ppx beginning 4/5  WBAT  PT/OT  Plan to DC HMV  4/5  Abduction pillow  Posterior hip precautions  Incentive Spirometry  DC planning

## 2018-04-05 NOTE — OCCUPATIONAL THERAPY INITIAL EVALUATION ADULT - GENERAL OBSERVATIONS, REHAB EVAL
Pt seen for initial OT eval, encountered supine in bed, A/AOx4, observed with an abductor pillow, hemovac & PICCO dressing in place. Pt followed commands. Pt c/o pain radiating from left hip to LLE due to s/p L THR. Posterior THP were reviewed & maintained. Pt exhibits deficits with functional mobility, ADL management, strength, ROM & balance. Pt performed bed mobility, sit to stand transfer & bed to commode transfer/WBAT with min assist with rolling walker. Pt seen for initial OT eval, encountered supine in bed, A/AOx4, observed with an abductor pillow, pneumatic teds, hemovac & PICCO dressing in place. Pt followed commands. Pt c/o pain radiating from left hip to LLE due to s/p L THR. Posterior THP were reviewed & maintained. Pt exhibits deficits with functional mobility, ADL management, strength, ROM & balance. Pt performed bed mobility, sit to stand transfer & bed to commode transfer/WBAT with min assist with rolling walker. Pt seen for initial OT eval, encountered supine in bed, A/AOx4, observed with an abductor pillow, pneumatic teds, hemovac & PICCO dressing in place. Pt followed commands. Pt c/o pain radiating from left hip to LLE due to s/p L THR. Posterior THP were reviewed & maintained. Pt exhibits deficits with functional mobility, ADL management, strength, ROM & balance.

## 2018-04-05 NOTE — DISCHARGE NOTE ADULT - PAIN PRESENT
Take your medications exactly as prescribed. Having your pain under control will help increase activity, improve deep breathing and coughing and prevent complications like pneumonia and blood clots in your legs. Some of the common side effects of pain medications are nausea, vomiting, itching, rash and upset stomach. Contact your doctor if you develop these or any other unusual systoms. Eat a diet rich in fiber and drink plenty of oral fluids. Use other pain management methods like, cold and warm applications, elevation of affected body part, listening to music, watching TV, yoga, etc.Do not take any other medications unless approved by your doctor. Do not drive, operate machinery, drink alcohol, or make any important decisions while taking narcotic pain medications. Store medication in its original bottle, in a locked cabinet away from the reach of children. Dispose of any unused and  medication safely./No

## 2018-04-05 NOTE — OCCUPATIONAL THERAPY INITIAL EVALUATION ADULT - SOCIAL CONCERNS
Pt voiced concerns about her return to home following her left THR. Pt states she will ask a friend or her boyfriend to help during her recovery at home./Complex psychosocial needs/coping issues

## 2018-04-05 NOTE — OCCUPATIONAL THERAPY INITIAL EVALUATION ADULT - PLANNED THERAPY INTERVENTIONS, OT EVAL
strengthening/stretching/IADL retraining/ROM/parent/caregiver training.../ADL retraining/bed mobility training/balance training/motor coordination training motor coordination training/parent/caregiver training.../energy conservation techniques/strengthening/neuromuscular re-education/ROM/balance training/ADL retraining/IADL retraining/bed mobility training/stretching

## 2018-04-05 NOTE — DISCHARGE NOTE ADULT - CARE PROVIDER_API CALL
Yunior De Jesus), Orthopaedic Surgery  [facility] [place], [place] [address]  Phone: [phone]  Fax: [phone]

## 2018-04-05 NOTE — PROGRESS NOTE ADULT - SUBJECTIVE AND OBJECTIVE BOX
Pt S/E at bedside, no acute O/N events. Pt complaining of pain, worse at L hip but also present in back and BL shoulders as pt has Hx of multiple orthopedic surgeries and on daily opioids at home.    Vital Signs Last 24 Hrs  T(C): 36.4 (04 Apr 2018 12:37), Max: 36.9 (04 Apr 2018 06:48)  T(F): 97.6 (04 Apr 2018 12:37), Max: 98.5 (04 Apr 2018 11:39)  HR: 93 (04 Apr 2018 12:37) (70 - 98)  BP: 126/65 (04 Apr 2018 12:58) (104/41 - 146/77)  RR: 18 (04 Apr 2018 12:37) (12 - 25)  SpO2: 98% (04 Apr 2018 12:37) (97% - 100%)    Gen: NAD, AAOx3    Left Lower Extremity:  Dressing clean dry intact  HMV in place  +EHL/FHL/TA/GS  SILT L3-S1  +DP/PT Pulses  Compartments soft  No calf TTP B/L

## 2018-04-05 NOTE — DISCHARGE NOTE ADULT - NSTOBACCOHOTLINE_GEN_A_CS
[facility] Smokers Quitline ([phone]) [facility] Smokers Quitline ([phone])

## 2018-04-05 NOTE — DISCHARGE NOTE ADULT - PLAN OF CARE
Return to baseline ADLs 1. Pain control  2. Walking with full weight bearing as tolerated with posterior hip precautions, with assistive devices (walker/cane) as needed  3. DVT Prophylaxis for 30 days with aspirin 325mg twice a day  4. Physical therapy  5. Follow up with Dr. De Jesus as outpatient in 10-14 days after discharge from the hospital or rehab. Call office for appointment.  6. Remove staples/sutures Post-Op Day 14, and remove dressing Post-Op Day 10 with daily dressing changes as needed.  7. Ice affected area as needed.  8. Keep dressing clean and dry. 1. Pain control  2. Walking with full weight bearing as tolerated with posterior hip precautions, with assistive devices (walker/cane) as needed  3. DVT Prophylaxis for 30 days with aspirin 325mg twice a day  4. Physical therapy  5. Follow up with Dr. De Jesus as outpatient in 10-14 days after discharge from the hospital or rehab. Call office for appointment.  6. Remove staples/sutures Post-Op Day 14, and remove dressing Post-Op Day 10 with daily dressing changes as needed.  7. Ice affected area as needed.  8. Keep dressing clean and dry. Remove TITO dressing at post-operative Day 7.

## 2018-04-05 NOTE — DISCHARGE NOTE ADULT - PATIENT PORTAL LINK FT
You can access the [facility] Patient Portal, offered by [facility], by registering with the following website: [email or web address]

## 2018-04-05 NOTE — OCCUPATIONAL THERAPY INITIAL EVALUATION ADULT - RANGE OF MOTION EXAMINATION, UPPER EXTREMITY
Left UE Active ROM was WNL (within normal limits)/Right UE Active ROM was WFL (within functional limits)/Left UE Passive ROM was WNL (within normal limits)/Right UE Passive ROM was WFL  (within functional limits)

## 2018-04-06 VITALS
RESPIRATION RATE: 16 BRPM | DIASTOLIC BLOOD PRESSURE: 72 MMHG | HEART RATE: 111 BPM | SYSTOLIC BLOOD PRESSURE: 117 MMHG | OXYGEN SATURATION: 98 %

## 2018-04-06 LAB
ANION GAP SERPL CALC-SCNC: 4 MMOL/L — LOW (ref 5–17)
BUN SERPL-MCNC: 7 MG/DL — SIGNIFICANT CHANGE UP (ref 7–23)
CALCIUM SERPL-MCNC: 7.5 MG/DL — LOW (ref 8.5–10.1)
CHLORIDE SERPL-SCNC: 108 MMOL/L — SIGNIFICANT CHANGE UP (ref 96–108)
CO2 SERPL-SCNC: 27 MMOL/L — SIGNIFICANT CHANGE UP (ref 22–31)
CREAT SERPL-MCNC: 0.51 MG/DL — SIGNIFICANT CHANGE UP (ref 0.5–1.3)
GLUCOSE SERPL-MCNC: 106 MG/DL — HIGH (ref 70–99)
HCT VFR BLD CALC: 30.2 % — LOW (ref 34.5–45)
HGB BLD-MCNC: 10.1 G/DL — LOW (ref 11.5–15.5)
MCHC RBC-ENTMCNC: 32.9 PG — SIGNIFICANT CHANGE UP (ref 27–34)
MCHC RBC-ENTMCNC: 33.4 GM/DL — SIGNIFICANT CHANGE UP (ref 32–36)
MCV RBC AUTO: 98.4 FL — SIGNIFICANT CHANGE UP (ref 80–100)
NRBC # BLD: 0 /100 WBCS — SIGNIFICANT CHANGE UP (ref 0–0)
PLATELET # BLD AUTO: 251 K/UL — SIGNIFICANT CHANGE UP (ref 150–400)
POTASSIUM SERPL-MCNC: 3.7 MMOL/L — SIGNIFICANT CHANGE UP (ref 3.5–5.3)
POTASSIUM SERPL-SCNC: 3.7 MMOL/L — SIGNIFICANT CHANGE UP (ref 3.5–5.3)
RBC # BLD: 3.07 M/UL — LOW (ref 3.8–5.2)
RBC # FLD: 16.6 % — HIGH (ref 10.3–14.5)
SODIUM SERPL-SCNC: 139 MMOL/L — SIGNIFICANT CHANGE UP (ref 135–145)
WBC # BLD: 12.25 K/UL — HIGH (ref 3.8–10.5)
WBC # FLD AUTO: 12.25 K/UL — HIGH (ref 3.8–10.5)

## 2018-04-06 RX ORDER — ASPIRIN/CALCIUM CARB/MAGNESIUM 324 MG
1 TABLET ORAL
Qty: 60 | Refills: 0 | OUTPATIENT
Start: 2018-04-06 | End: 2018-05-05

## 2018-04-06 RX ORDER — HYDROMORPHONE HYDROCHLORIDE 2 MG/ML
1 INJECTION INTRAMUSCULAR; INTRAVENOUS; SUBCUTANEOUS
Qty: 30 | Refills: 0 | OUTPATIENT
Start: 2018-04-06 | End: 2018-04-10

## 2018-04-06 RX ADMIN — Medication 325 MILLIGRAM(S): at 06:09

## 2018-04-06 RX ADMIN — OXYCODONE HYDROCHLORIDE 10 MILLIGRAM(S): 5 TABLET ORAL at 06:09

## 2018-04-06 RX ADMIN — Medication 2 MILLIGRAM(S): at 00:55

## 2018-04-06 RX ADMIN — Medication 500 MILLIGRAM(S): at 06:09

## 2018-04-06 RX ADMIN — HYDROMORPHONE HYDROCHLORIDE 1.5 MILLIGRAM(S): 2 INJECTION INTRAMUSCULAR; INTRAVENOUS; SUBCUTANEOUS at 10:47

## 2018-04-06 RX ADMIN — LOSARTAN POTASSIUM 100 MILLIGRAM(S): 100 TABLET, FILM COATED ORAL at 06:09

## 2018-04-06 RX ADMIN — Medication 325 MILLIGRAM(S): at 08:15

## 2018-04-06 RX ADMIN — HYDROMORPHONE HYDROCHLORIDE 4 MILLIGRAM(S): 2 INJECTION INTRAMUSCULAR; INTRAVENOUS; SUBCUTANEOUS at 08:44

## 2018-04-06 RX ADMIN — OXYCODONE HYDROCHLORIDE 10 MILLIGRAM(S): 5 TABLET ORAL at 07:09

## 2018-04-06 RX ADMIN — Medication 150 MICROGRAM(S): at 06:09

## 2018-04-06 RX ADMIN — FAMOTIDINE 20 MILLIGRAM(S): 10 INJECTION INTRAVENOUS at 06:10

## 2018-04-06 RX ADMIN — HYDROMORPHONE HYDROCHLORIDE 4 MILLIGRAM(S): 2 INJECTION INTRAMUSCULAR; INTRAVENOUS; SUBCUTANEOUS at 07:45

## 2018-04-06 RX ADMIN — HYDROMORPHONE HYDROCHLORIDE 4 MILLIGRAM(S): 2 INJECTION INTRAMUSCULAR; INTRAVENOUS; SUBCUTANEOUS at 00:13

## 2018-04-06 RX ADMIN — HYDROMORPHONE HYDROCHLORIDE 4 MILLIGRAM(S): 2 INJECTION INTRAMUSCULAR; INTRAVENOUS; SUBCUTANEOUS at 04:07

## 2018-04-06 RX ADMIN — HYDROMORPHONE HYDROCHLORIDE 4 MILLIGRAM(S): 2 INJECTION INTRAMUSCULAR; INTRAVENOUS; SUBCUTANEOUS at 05:07

## 2018-04-06 NOTE — PROGRESS NOTE ADULT - ASSESSMENT
52F s/p L JEREMIAS POD 2  Analgesia  DVT ppx  WBAT LLE with posterior hip precautions  PT/OT  Incentive spirometry  DC planning to home today

## 2018-04-06 NOTE — PROGRESS NOTE ADULT - SUBJECTIVE AND OBJECTIVE BOX
Pt S/E at bedside, no acute events overnight, pain controlled. No complaints this morning.     Vital Signs Last 24 Hrs  T(C): 37.4 (06 Apr 2018 06:07), Max: 37.7 (06 Apr 2018 00:24)  T(F): 99.4 (06 Apr 2018 06:07), Max: 99.9 (06 Apr 2018 00:24)  HR: 98 (06 Apr 2018 06:07) (60 - 112)  BP: 147/85 (06 Apr 2018 06:07) (97/44 - 150/80)  BP(mean): --  RR: 15 (06 Apr 2018 06:07) (15 - 18)  SpO2: 100% (06 Apr 2018 06:07) (96% - 100%)    Gen: NAD, AAOx3    Left Lower Extremity:  +TITO Dressing clean dry intact, +abd pillow  +EHL/FHL/TA/GS  SILT L3-S1  +DP/PT Pulses  Compartments soft  No calf TTP B/L

## 2018-04-08 DIAGNOSIS — E53.8 DEFICIENCY OF OTHER SPECIFIED B GROUP VITAMINS: ICD-10-CM

## 2018-04-08 DIAGNOSIS — M16.12 UNILATERAL PRIMARY OSTEOARTHRITIS, LEFT HIP: ICD-10-CM

## 2018-04-08 DIAGNOSIS — Z79.51 LONG TERM (CURRENT) USE OF INHALED STEROIDS: ICD-10-CM

## 2018-04-08 DIAGNOSIS — F32.9 MAJOR DEPRESSIVE DISORDER, SINGLE EPISODE, UNSPECIFIED: ICD-10-CM

## 2018-04-08 DIAGNOSIS — Z88.0 ALLERGY STATUS TO PENICILLIN: ICD-10-CM

## 2018-04-08 DIAGNOSIS — Z79.891 LONG TERM (CURRENT) USE OF OPIATE ANALGESIC: ICD-10-CM

## 2018-04-08 DIAGNOSIS — Z86.718 PERSONAL HISTORY OF OTHER VENOUS THROMBOSIS AND EMBOLISM: ICD-10-CM

## 2018-04-08 DIAGNOSIS — Z79.1 LONG TERM (CURRENT) USE OF NON-STEROIDAL ANTI-INFLAMMATORIES (NSAID): ICD-10-CM

## 2018-04-08 DIAGNOSIS — E03.9 HYPOTHYROIDISM, UNSPECIFIED: ICD-10-CM

## 2018-04-08 DIAGNOSIS — Z96.641 PRESENCE OF RIGHT ARTIFICIAL HIP JOINT: ICD-10-CM

## 2018-04-08 DIAGNOSIS — J45.909 UNSPECIFIED ASTHMA, UNCOMPLICATED: ICD-10-CM

## 2018-04-08 DIAGNOSIS — F41.9 ANXIETY DISORDER, UNSPECIFIED: ICD-10-CM

## 2018-04-08 DIAGNOSIS — I10 ESSENTIAL (PRIMARY) HYPERTENSION: ICD-10-CM

## 2018-04-09 LAB — SURGICAL PATHOLOGY FINAL REPORT - CH: SIGNIFICANT CHANGE UP

## 2019-03-30 PROBLEM — I82.409 ACUTE EMBOLISM AND THROMBOSIS OF UNSPECIFIED DEEP VEINS OF UNSPECIFIED LOWER EXTREMITY: Chronic | Status: ACTIVE | Noted: 2017-08-03

## 2019-04-11 ENCOUNTER — TRANSCRIPTION ENCOUNTER (OUTPATIENT)
Age: 54
End: 2019-04-11

## 2019-04-12 ENCOUNTER — OUTPATIENT (OUTPATIENT)
Dept: OUTPATIENT SERVICES | Facility: HOSPITAL | Age: 54
LOS: 1 days | Discharge: HOME | End: 2019-04-12

## 2019-04-12 VITALS
HEART RATE: 82 BPM | TEMPERATURE: 97 F | DIASTOLIC BLOOD PRESSURE: 50 MMHG | SYSTOLIC BLOOD PRESSURE: 105 MMHG | RESPIRATION RATE: 19 BRPM | OXYGEN SATURATION: 97 %

## 2019-04-12 VITALS
WEIGHT: 169.98 LBS | HEART RATE: 99 BPM | DIASTOLIC BLOOD PRESSURE: 56 MMHG | RESPIRATION RATE: 16 BRPM | OXYGEN SATURATION: 99 % | SYSTOLIC BLOOD PRESSURE: 101 MMHG | HEIGHT: 63 IN | TEMPERATURE: 99 F

## 2019-04-12 DIAGNOSIS — Z98.1 ARTHRODESIS STATUS: Chronic | ICD-10-CM

## 2019-04-12 DIAGNOSIS — Z96.641 PRESENCE OF RIGHT ARTIFICIAL HIP JOINT: Chronic | ICD-10-CM

## 2019-04-12 DIAGNOSIS — Z01.818 ENCOUNTER FOR OTHER PREPROCEDURAL EXAMINATION: ICD-10-CM

## 2019-04-12 DIAGNOSIS — Z98.890 OTHER SPECIFIED POSTPROCEDURAL STATES: Chronic | ICD-10-CM

## 2019-04-12 LAB — HCG UR QL: NEGATIVE — SIGNIFICANT CHANGE UP

## 2019-04-12 RX ORDER — CETIRIZINE HYDROCHLORIDE 10 MG/1
1 TABLET ORAL
Qty: 0 | Refills: 0 | COMMUNITY

## 2019-04-12 RX ORDER — FENTANYL CITRATE 50 UG/ML
50 INJECTION INTRAVENOUS
Qty: 0 | Refills: 0 | Status: DISCONTINUED | OUTPATIENT
Start: 2019-04-12 | End: 2019-04-12

## 2019-04-12 RX ORDER — HYDROMORPHONE HYDROCHLORIDE 2 MG/ML
0.5 INJECTION INTRAMUSCULAR; INTRAVENOUS; SUBCUTANEOUS
Qty: 0 | Refills: 0 | Status: DISCONTINUED | OUTPATIENT
Start: 2019-04-12 | End: 2019-04-12

## 2019-04-12 RX ORDER — SODIUM CHLORIDE 9 MG/ML
1000 INJECTION, SOLUTION INTRAVENOUS
Qty: 0 | Refills: 0 | Status: DISCONTINUED | OUTPATIENT
Start: 2019-04-12 | End: 2019-04-12

## 2019-04-12 RX ORDER — OXYCODONE HYDROCHLORIDE 5 MG/1
1 TABLET ORAL
Qty: 42 | Refills: 0
Start: 2019-04-12 | End: 2019-04-18

## 2019-04-12 RX ORDER — SERTRALINE 25 MG/1
2 TABLET, FILM COATED ORAL
Qty: 0 | Refills: 0 | COMMUNITY

## 2019-04-12 RX ORDER — ACETAMINOPHEN 500 MG
1000 TABLET ORAL ONCE
Qty: 0 | Refills: 0 | Status: COMPLETED | OUTPATIENT
Start: 2019-04-12 | End: 2019-04-12

## 2019-04-12 RX ADMIN — SODIUM CHLORIDE 75 MILLILITER(S): 9 INJECTION, SOLUTION INTRAVENOUS at 09:09

## 2019-04-12 RX ADMIN — Medication 1000 MILLIGRAM(S): at 09:30

## 2019-04-12 RX ADMIN — HYDROMORPHONE HYDROCHLORIDE 0.5 MILLIGRAM(S): 2 INJECTION INTRAMUSCULAR; INTRAVENOUS; SUBCUTANEOUS at 09:08

## 2019-04-12 RX ADMIN — FENTANYL CITRATE 50 MICROGRAM(S): 50 INJECTION INTRAVENOUS at 09:30

## 2019-04-12 RX ADMIN — FENTANYL CITRATE 50 MICROGRAM(S): 50 INJECTION INTRAVENOUS at 09:08

## 2019-04-12 RX ADMIN — HYDROMORPHONE HYDROCHLORIDE 0.5 MILLIGRAM(S): 2 INJECTION INTRAMUSCULAR; INTRAVENOUS; SUBCUTANEOUS at 09:30

## 2019-04-12 RX ADMIN — Medication 400 MILLIGRAM(S): at 09:09

## 2019-04-12 NOTE — ASU DISCHARGE PLAN (ADULT/PEDIATRIC) - ASU DC SPECIAL INSTRUCTIONSFT
Knee Arthroscopy Instructions    1) Your knee will swell over the next 48hours and you can expect pain to get a bit worse. Ice your Knee plenty, continuously if possible. Fill up a plastic bag, then wrap it in a towel or pillow case.     2) Elevate your leg above your heart with about 3 pillows when you can, when you are in bed or chair. Otherwise you should be up and about, walking as much as you can tolereate. The more you move the better you will do. Weight bearing as tolerated.    3) BANDAGE: Expect some mild bloody drainage. It is normal. It may soak through the gauze and ACE bandage. This is mostly leftover Saline fluid coming out of your knee from surgery. Just place another Gauze and another ACE over it and wrap it snug but not overly tight. If it bleeds through the second bandage again, call.    4) SHOWER: Remove bandage in 5 days and place waterproof band aides. You can shower in 5 days. No bath. Pat your incisions dry. No creams, no lotions.    5) Only reason to worry would be if pain got so severe that you cannot feel or wiggle your toes, or if your foot is cold. In this case you need to call or come to the ER. But as long as you can feel and wiggle your toes, you are fine.    6) Call the office to schedule a follow up appointment to be seen in 10-14 days. Your Sutures will be removed at that point.    7) A pain Rx is in the chart; fill it on your way home. OR was sent electronically to your pharmacy, pick it up on the way home. Knee Arthroscopy Instructions    1) Your knee will swell over the next 48hours and you can expect pain to get a bit worse. Ice your Knee plenty, continuously if possible. Fill up a plastic bag, then wrap it in a towel or pillow case.     2) Elevate your leg above your heart with about 3 pillows when you can, when you are in bed or chair. Otherwise you should be up and about, walking as much as you can tolerate. The more you move the better you will do. Weight bearing as tolerated with knee immobilizer     3) BANDAGE: Expect some mild bloody drainage. It is normal. It may soak through the gauze and ACE bandage. This is mostly leftover Saline fluid coming out of your knee from surgery. Just place another Gauze and another ACE over it and wrap it snug but not overly tight. If it bleeds through the second bandage again, call.    4) SHOWER: Remove bandage in 5 days and place waterproof band aides. You can shower in 5 days. No bath. Pat your incisions dry. No creams, no lotions.    5) Only reason to worry would be if pain got so severe that you cannot feel or wiggle your toes, or if your foot is cold. In this case you need to call or come to the ER. But as long as you can feel and wiggle your toes, you are fine.    6) Call the office to schedule a follow up appointment to be seen in 10-14 days. Your Sutures will be removed at that point.    7) A pain Rx is in the chart; fill it on your way home. OR was sent electronically to your pharmacy, pick it up on the way home.

## 2019-04-12 NOTE — ASU DISCHARGE PLAN (ADULT/PEDIATRIC) - CARE PROVIDER_API CALL
Phil Whaley (DO)  Orthopaedic Surgery  [address] [place] [address]  Phone: [phone]  Fax: [phone]  Follow Up Time:

## 2019-04-12 NOTE — ASU DISCHARGE PLAN (ADULT/PEDIATRIC) - PAIN MANAGEMENT
Prescriptions electronically submitted to pharmacy from doctor's office/patient has to pickup medication

## 2019-04-12 NOTE — H&P ADULT - NSICDXPASTSURGICALHX_GEN_ALL_CORE_FT
PAST SURGICAL HISTORY:  S/P hip replacement, right     S/P lumbar spinal fusion     S/P shoulder surgery right and left

## 2019-04-12 NOTE — H&P ADULT - NSHPPHYSICALEXAM_GEN_ALL_CORE
Gen: A/ox3. NAD  RLE: Skin CDI. No redness or warmth.   TTP diffusely.   No effusion.   ROM 0-110  Stable collaterals.   Calf SNT  NV intact  Brisk cap refill  2+DP/PT

## 2019-04-12 NOTE — H&P ADULT - HISTORY OF PRESENT ILLNESS
53F presents for R knee arthroscopy with Dr. Whaley 4/12 for internal derangement of knee. Pt followed as outpatient. Notes diffuse knee pain. No other complaints. Otherwise well.

## 2019-04-12 NOTE — ASU DISCHARGE PLAN (ADULT/PEDIATRIC) - NURSING INSTRUCTIONS
You were given IV Tylenol (1000mg) for pain management in the Operating Room.  Please do NOT take any additonal tylenol/acetaminophen products (Percocet, Vicodin, Excedrin) for the next 6-8 hours (after  315PM ). Please do not take tylenol AND percocet/vicodin/excedrin as narcotic prescription already contains tylenol.    When taking pain/narcotic medications - take with food as it can cause nausea if taken on an empty stomach, and know it may cause constipation. To prevent constipation increase fluids and fiber in diet. You may take stool softeners (such as Colace) and follow directions as printed on bottle. - Do NOT drive while on narcotics.     You were given IV antibiotics in the OR. IF you are prescribed oral antibiotics to take at home. Please follow directions on the bottle and start your first dose of antibiotics before bedtime, and schedule your own timing until you finish all antibiotics. DO NOT DISCONTINUE ON YOUR OWN.

## 2019-04-12 NOTE — H&P ADULT - ASSESSMENT
A/P: 53F for R knee arthroscopy with Dr. Whaley 4/12 for internal derangement of knee.     -Pt for knee arthroscopy today  -anesthesia management periop  -see dc paperwork and instructions   -Will FELIX Whaley

## 2019-04-12 NOTE — ASU DISCHARGE PLAN (ADULT/PEDIATRIC) - SPECIFY DIET AND FLUID
Start with a bland diet, clear liquids at a slow pace. Eat small, frequent meals for today. Nothing spicy or greasy for today. You may or may not have an appetite, which is normal due to anesthesia. However, drink plenty of fluids throughout the day.  You may or may not experience a sore throat after surgery. If you do experience a sore throat, it is likely from being intubated in the operating room. Sore throat will reside in 24-48 hours.

## 2019-04-12 NOTE — ASU DISCHARGE PLAN (ADULT/PEDIATRIC) - CALL YOUR DOCTOR IF YOU HAVE ANY OF THE FOLLOWING:
Pain not relieved by Medications/Bleeding that does not stop/Swelling that gets worse/Numbness, tingling, color or temperature change to extremity Wound/Surgical Site with redness, or foul smelling discharge or pus/Fever greater than (need to indicate Fahrenheit or Celsius)/Bleeding that does not stop/Pain not relieved by Medications/Unable to urinate/Swelling that gets worse/Numbness, tingling, color or temperature change to extremity

## 2019-04-12 NOTE — H&P ADULT - NSICDXPASTMEDICALHX_GEN_ALL_CORE_FT
PAST MEDICAL HISTORY:  Anxiety     Arthritis     Asthma     B12 deficiency     Depression     DVT (deep venous thrombosis) right leg approx 7 years ago    Herniated lumbar intervertebral disc     HTN (hypertension)     Hypothyroid

## 2019-04-16 DIAGNOSIS — J45.909 UNSPECIFIED ASTHMA, UNCOMPLICATED: ICD-10-CM

## 2019-04-16 DIAGNOSIS — M23.261 DERANGEMENT OF OTHER LATERAL MENISCUS DUE TO OLD TEAR OR INJURY, RIGHT KNEE: ICD-10-CM

## 2019-04-16 DIAGNOSIS — M67.51 PLICA SYNDROME, RIGHT KNEE: ICD-10-CM

## 2019-04-16 DIAGNOSIS — M25.561 PAIN IN RIGHT KNEE: ICD-10-CM

## 2019-04-16 DIAGNOSIS — M65.9 SYNOVITIS AND TENOSYNOVITIS, UNSPECIFIED: ICD-10-CM

## 2019-04-16 DIAGNOSIS — E53.8 DEFICIENCY OF OTHER SPECIFIED B GROUP VITAMINS: ICD-10-CM

## 2019-04-16 DIAGNOSIS — I10 ESSENTIAL (PRIMARY) HYPERTENSION: ICD-10-CM

## 2019-04-16 DIAGNOSIS — E03.9 HYPOTHYROIDISM, UNSPECIFIED: ICD-10-CM

## 2019-04-16 DIAGNOSIS — Z86.718 PERSONAL HISTORY OF OTHER VENOUS THROMBOSIS AND EMBOLISM: ICD-10-CM

## 2019-04-16 DIAGNOSIS — Z98.1 ARTHRODESIS STATUS: ICD-10-CM

## 2019-04-16 DIAGNOSIS — Z96.641 PRESENCE OF RIGHT ARTIFICIAL HIP JOINT: ICD-10-CM

## 2019-04-16 DIAGNOSIS — Z88.0 ALLERGY STATUS TO PENICILLIN: ICD-10-CM

## 2019-04-16 DIAGNOSIS — M94.261 CHONDROMALACIA, RIGHT KNEE: ICD-10-CM

## 2019-04-16 DIAGNOSIS — Z79.82 LONG TERM (CURRENT) USE OF ASPIRIN: ICD-10-CM

## 2019-04-16 DIAGNOSIS — F41.8 OTHER SPECIFIED ANXIETY DISORDERS: ICD-10-CM

## 2019-04-16 DIAGNOSIS — M19.90 UNSPECIFIED OSTEOARTHRITIS, UNSPECIFIED SITE: ICD-10-CM

## 2019-10-17 ENCOUNTER — EMERGENCY (EMERGENCY)
Facility: HOSPITAL | Age: 54
LOS: 0 days | Discharge: LEFT BEFORE DISCHARGE | End: 2019-10-17
Attending: EMERGENCY MEDICINE
Payer: MEDICAID

## 2019-10-17 VITALS
DIASTOLIC BLOOD PRESSURE: 74 MMHG | HEIGHT: 65 IN | TEMPERATURE: 99 F | SYSTOLIC BLOOD PRESSURE: 161 MMHG | RESPIRATION RATE: 18 BRPM | HEART RATE: 99 BPM | WEIGHT: 175.05 LBS | OXYGEN SATURATION: 97 %

## 2019-10-17 DIAGNOSIS — Z98.1 ARTHRODESIS STATUS: Chronic | ICD-10-CM

## 2019-10-17 DIAGNOSIS — Z98.890 OTHER SPECIFIED POSTPROCEDURAL STATES: Chronic | ICD-10-CM

## 2019-10-17 DIAGNOSIS — Z96.641 PRESENCE OF RIGHT ARTIFICIAL HIP JOINT: Chronic | ICD-10-CM

## 2019-10-17 PROCEDURE — 99282 EMERGENCY DEPT VISIT SF MDM: CPT

## 2019-10-17 NOTE — ED PROVIDER NOTE - MUSCULOSKELETAL, MLM
Spine appears normal, range of motion is not limited, left knee tenderness nor erythema, fluid collection appreciated, full ROM.

## 2019-10-17 NOTE — ED ADULT NURSE NOTE - NSIMPLEMENTINTERV_GEN_ALL_ED
Implemented All Fall Risk Interventions:  [place] to call system. Call bell, personal items and telephone within reach. Instruct patient to call for assistance. Room bathroom lighting operational. Non-slip footwear when patient is off stretcher. Physically safe environment: no spills, clutter or unnecessary equipment. Stretcher in lowest position, wheels locked, appropriate side rails in place. Provide visual cue, wrist band, yellow gown, etc. Monitor gait and stability. Monitor for mental status changes and reorient to person, place, and time. Review medications for side effects contributing to fall risk. Reinforce activity limits and safety measures with patient and family.

## 2019-10-17 NOTE — ED ADULT NURSE NOTE - OBJECTIVE STATEMENT
as per patient , she has had a swollen left knee for approx. 1 month , pt sent by surgeon , to r/o effusion prior to have knee surgery .  pt is refusing to stay and have lab work done , hestitant about being here , MD Gibson is aware .

## 2019-10-17 NOTE — ED ADULT NURSE REASSESSMENT NOTE - NS ED NURSE REASSESS COMMENT FT1
pt spoke wit orthopedic resident and is still refusing care at this time, refusing xrays, and lab work at this time .md Arthur [place] and pt is also aware she is at risks for an infection ,

## 2019-10-17 NOTE — ED PROVIDER NOTE - CLINICAL SUMMARY MEDICAL DECISION MAKING FREE TEXT BOX
DDx: effusion confirmed/ septic joint unlikely given duration of symptoms and lack of fever.  Plan: orthopedic consult, CBC, CMP, ESR, and CRP. DDx: effusion from confirmed ligamentous tear/ septic joint unlikely given duration of symptoms and lack of fever.  Plan: orthopedic consult, CBC, CMP, ESR, and CRP.

## 2019-10-17 NOTE — CHART NOTE - NSCHARTNOTEFT_GEN_A_CORE
Spoke with patient at beside in ER today. Patient is adamantly refusing workup for possible infection involving the left knee and refusing to cooperate with history, physical exam, labs, or radiographs. I explained to patient the risks of refusing evaluation and treatment including risk of septic arthritis, further spread of infection and possible amputation. Patient verbalized understanding of risks and continues to refuse evaluation. Patient walked out of ED AMA.

## 2019-10-17 NOTE — ED ADULT NURSE NOTE - NS ED NURSE ELOPE COMMENTS
pt refused all treatment , pt was spoken to by surgeon, and ed md , pt left alert and oriented x4, with family

## 2019-10-17 NOTE — ED PROVIDER NOTE - OBJECTIVE STATEMENT
Pt is a 54 year old female with a pmhx of DVT, HTN, anxiety, asthma, depression and hypothyroidism who presents to the ED for knee pain. Pt is a 54 year old female with a pmhx of DVT, HTN, anxiety, asthma, depression and hypothyroidism who presents to the ED for knee pain. Pt reports left knee with swelling and pain x3 months constantly which has remained unchanged. She was evaluated by her orthopedic surgeon x1 month for her swelling and was told that she has "muscle tares" and she needed to go to the ED. Pt decided to wait until today to come into the ED for evaluation. She notes that she can not have any surgery for her left knee until she has been evaluated. She denies any fever, chills, malaise, erythema, warmth of left knee, nausea, vomiting, or diarrhea. She has had a right knee replacement preformed by the same surgeon.

## 2019-10-17 NOTE — ED ADULT TRIAGE NOTE - CHIEF COMPLAINT QUOTE
pt walk in with complaint of left knee pain 3 months, pt has tears in left knee, pt sent by MD for left knee swelling for 3 months to rule out septic joint, no redness to joint. pt denies fevers.

## 2019-10-19 DIAGNOSIS — M25.562 PAIN IN LEFT KNEE: ICD-10-CM

## 2019-10-19 DIAGNOSIS — F41.9 ANXIETY DISORDER, UNSPECIFIED: ICD-10-CM

## 2019-10-19 DIAGNOSIS — F32.9 MAJOR DEPRESSIVE DISORDER, SINGLE EPISODE, UNSPECIFIED: ICD-10-CM

## 2019-10-19 DIAGNOSIS — Z86.718 PERSONAL HISTORY OF OTHER VENOUS THROMBOSIS AND EMBOLISM: ICD-10-CM

## 2019-10-19 DIAGNOSIS — Z88.0 ALLERGY STATUS TO PENICILLIN: ICD-10-CM

## 2019-10-19 DIAGNOSIS — M19.90 UNSPECIFIED OSTEOARTHRITIS, UNSPECIFIED SITE: ICD-10-CM

## 2019-10-19 DIAGNOSIS — E53.8 DEFICIENCY OF OTHER SPECIFIED B GROUP VITAMINS: ICD-10-CM

## 2019-10-19 DIAGNOSIS — M25.462 EFFUSION, LEFT KNEE: ICD-10-CM

## 2019-10-19 DIAGNOSIS — E03.9 HYPOTHYROIDISM, UNSPECIFIED: ICD-10-CM

## 2019-10-19 DIAGNOSIS — I10 ESSENTIAL (PRIMARY) HYPERTENSION: ICD-10-CM

## 2019-10-29 ENCOUNTER — EMERGENCY (EMERGENCY)
Facility: HOSPITAL | Age: 54
LOS: 0 days | Discharge: HOME | End: 2019-10-29
Attending: EMERGENCY MEDICINE
Payer: MEDICAID

## 2019-10-29 VITALS
WEIGHT: 175.05 LBS | OXYGEN SATURATION: 97 % | SYSTOLIC BLOOD PRESSURE: 127 MMHG | HEIGHT: 65 IN | DIASTOLIC BLOOD PRESSURE: 72 MMHG | TEMPERATURE: 99 F | HEART RATE: 94 BPM | RESPIRATION RATE: 16 BRPM

## 2019-10-29 VITALS
TEMPERATURE: 98 F | SYSTOLIC BLOOD PRESSURE: 140 MMHG | DIASTOLIC BLOOD PRESSURE: 80 MMHG | RESPIRATION RATE: 17 BRPM | HEART RATE: 100 BPM | OXYGEN SATURATION: 98 %

## 2019-10-29 DIAGNOSIS — Z98.1 ARTHRODESIS STATUS: Chronic | ICD-10-CM

## 2019-10-29 DIAGNOSIS — F32.9 MAJOR DEPRESSIVE DISORDER, SINGLE EPISODE, UNSPECIFIED: ICD-10-CM

## 2019-10-29 DIAGNOSIS — Z88.0 ALLERGY STATUS TO PENICILLIN: ICD-10-CM

## 2019-10-29 DIAGNOSIS — E03.9 HYPOTHYROIDISM, UNSPECIFIED: ICD-10-CM

## 2019-10-29 DIAGNOSIS — M25.569 PAIN IN UNSPECIFIED KNEE: ICD-10-CM

## 2019-10-29 DIAGNOSIS — Z00.00 ENCOUNTER FOR GENERAL ADULT MEDICAL EXAMINATION WITHOUT ABNORMAL FINDINGS: ICD-10-CM

## 2019-10-29 DIAGNOSIS — Z98.890 OTHER SPECIFIED POSTPROCEDURAL STATES: Chronic | ICD-10-CM

## 2019-10-29 DIAGNOSIS — Z86.718 PERSONAL HISTORY OF OTHER VENOUS THROMBOSIS AND EMBOLISM: ICD-10-CM

## 2019-10-29 DIAGNOSIS — E53.8 DEFICIENCY OF OTHER SPECIFIED B GROUP VITAMINS: ICD-10-CM

## 2019-10-29 DIAGNOSIS — I10 ESSENTIAL (PRIMARY) HYPERTENSION: ICD-10-CM

## 2019-10-29 DIAGNOSIS — F41.9 ANXIETY DISORDER, UNSPECIFIED: ICD-10-CM

## 2019-10-29 DIAGNOSIS — Z96.641 PRESENCE OF RIGHT ARTIFICIAL HIP JOINT: Chronic | ICD-10-CM

## 2019-10-29 LAB
ALBUMIN SERPL ELPH-MCNC: 3.9 G/DL — SIGNIFICANT CHANGE UP (ref 3.3–5)
ALP SERPL-CCNC: 152 U/L — HIGH (ref 40–120)
ALT FLD-CCNC: 50 U/L — SIGNIFICANT CHANGE UP (ref 12–78)
ANION GAP SERPL CALC-SCNC: 6 MMOL/L — SIGNIFICANT CHANGE UP (ref 5–17)
AST SERPL-CCNC: 59 U/L — HIGH (ref 15–37)
B PERT IGG+IGM PNL SER: CLEAR — SIGNIFICANT CHANGE UP
BASOPHILS # BLD AUTO: 0.15 K/UL — SIGNIFICANT CHANGE UP (ref 0–0.2)
BASOPHILS NFR BLD AUTO: 1.1 % — SIGNIFICANT CHANGE UP (ref 0–2)
BILIRUB SERPL-MCNC: 0.3 MG/DL — SIGNIFICANT CHANGE UP (ref 0.2–1.2)
BUN SERPL-MCNC: 12 MG/DL — SIGNIFICANT CHANGE UP (ref 7–23)
CALCIUM SERPL-MCNC: 9.7 MG/DL — SIGNIFICANT CHANGE UP (ref 8.5–10.1)
CHLORIDE SERPL-SCNC: 103 MMOL/L — SIGNIFICANT CHANGE UP (ref 96–108)
CO2 SERPL-SCNC: 29 MMOL/L — SIGNIFICANT CHANGE UP (ref 22–31)
COLOR FLD: YELLOW — SIGNIFICANT CHANGE UP
CREAT SERPL-MCNC: 0.7 MG/DL — SIGNIFICANT CHANGE UP (ref 0.5–1.3)
CRP SERPL-MCNC: 2.6 MG/DL — HIGH (ref 0–0.4)
EOSINOPHIL # BLD AUTO: 1.75 K/UL — HIGH (ref 0–0.5)
EOSINOPHIL NFR BLD AUTO: 13.2 % — HIGH (ref 0–6)
ERYTHROCYTE [SEDIMENTATION RATE] IN BLOOD: 23 MM/HR — HIGH (ref 0–20)
FLUID INTAKE SUBSTANCE CLASS: SIGNIFICANT CHANGE UP
FLUID SEGMENTED GRANULOCYTES: 8 % — SIGNIFICANT CHANGE UP
GLUCOSE SERPL-MCNC: 89 MG/DL — SIGNIFICANT CHANGE UP (ref 70–99)
GRAM STN FLD: SIGNIFICANT CHANGE UP
HCT VFR BLD CALC: 41.9 % — SIGNIFICANT CHANGE UP (ref 34.5–45)
HGB BLD-MCNC: 13.4 G/DL — SIGNIFICANT CHANGE UP (ref 11.5–15.5)
IMM GRANULOCYTES NFR BLD AUTO: 0.7 % — SIGNIFICANT CHANGE UP (ref 0–1.5)
LYMPHOCYTES # BLD AUTO: 35.4 % — SIGNIFICANT CHANGE UP (ref 13–44)
LYMPHOCYTES # BLD AUTO: 4.67 K/UL — HIGH (ref 1–3.3)
MCHC RBC-ENTMCNC: 29.6 PG — SIGNIFICANT CHANGE UP (ref 27–34)
MCHC RBC-ENTMCNC: 32 GM/DL — SIGNIFICANT CHANGE UP (ref 32–36)
MCV RBC AUTO: 92.5 FL — SIGNIFICANT CHANGE UP (ref 80–100)
MONOCYTES # BLD AUTO: 0.92 K/UL — HIGH (ref 0–0.9)
MONOCYTES NFR BLD AUTO: 7 % — SIGNIFICANT CHANGE UP (ref 2–14)
MONOS+MACROS # FLD: 92 % — SIGNIFICANT CHANGE UP
NEUTROPHILS # BLD AUTO: 5.63 K/UL — SIGNIFICANT CHANGE UP (ref 1.8–7.4)
NEUTROPHILS NFR BLD AUTO: 42.6 % — LOW (ref 43–77)
NRBC # BLD: 0 /100 WBCS — SIGNIFICANT CHANGE UP (ref 0–0)
PLATELET # BLD AUTO: 483 K/UL — HIGH (ref 150–400)
POTASSIUM SERPL-MCNC: 4.1 MMOL/L — SIGNIFICANT CHANGE UP (ref 3.5–5.3)
POTASSIUM SERPL-SCNC: 4.1 MMOL/L — SIGNIFICANT CHANGE UP (ref 3.5–5.3)
PROT SERPL-MCNC: 8.4 GM/DL — HIGH (ref 6–8.3)
RBC # BLD: 4.53 M/UL — SIGNIFICANT CHANGE UP (ref 3.8–5.2)
RBC # FLD: 13.4 % — SIGNIFICANT CHANGE UP (ref 10.3–14.5)
RCV VOL RI: 7000 /UL — HIGH (ref 0–5)
SODIUM SERPL-SCNC: 138 MMOL/L — SIGNIFICANT CHANGE UP (ref 135–145)
SPECIMEN SOURCE: SIGNIFICANT CHANGE UP
SYNOVIAL CRYSTALS CLARITY: ABNORMAL
SYNOVIAL CRYSTALS COLOR: ABNORMAL
SYNOVIAL CRYSTALS ID: ABNORMAL
SYNOVIAL CRYSTALS TUBE: SIGNIFICANT CHANGE UP
TOTAL NUCLEATED CELL COUNT, BODY FLUID: 276 /UL — HIGH (ref 0–5)
TUBE TYPE: SIGNIFICANT CHANGE UP
WBC # BLD: 13.21 K/UL — HIGH (ref 3.8–10.5)
WBC # FLD AUTO: 13.21 K/UL — HIGH (ref 3.8–10.5)

## 2019-10-29 PROCEDURE — 73562 X-RAY EXAM OF KNEE 3: CPT | Mod: 26,LT

## 2019-10-29 PROCEDURE — 99284 EMERGENCY DEPT VISIT MOD MDM: CPT

## 2019-10-29 RX ORDER — SODIUM CHLORIDE 9 MG/ML
1000 INJECTION, SOLUTION INTRAVENOUS
Refills: 0 | Status: DISCONTINUED | OUTPATIENT
Start: 2019-10-29 | End: 2019-10-29

## 2019-10-29 RX ORDER — OXYCODONE HYDROCHLORIDE 5 MG/1
20 TABLET ORAL ONCE
Refills: 0 | Status: DISCONTINUED | OUTPATIENT
Start: 2019-10-29 | End: 2019-10-29

## 2019-10-29 NOTE — ED PROVIDER NOTE - CLINICAL SUMMARY MEDICAL DECISION MAKING FREE TEXT BOX
pt seen by ortho, had aspirate done. they requested lab work but pt decided to leave AMA. Pt does not want to wait for aspirate results. The patient is AAOx3, not intoxicated, and displays normal decision making ability.  The patient has decided to leave against medical advice.  We discussed all risks, benefits, and alternatives to the progression of treatment and the potential dangers of leaving including but not limited to permanent disability, injury, and death.  The patient was instructed that they are welcome to change their decision to leave against medical advice and return to the emergency department at any time and for any reason in order to allow us to render care.

## 2019-10-29 NOTE — ED PROVIDER NOTE - OBJECTIVE STATEMENT
55yo female with pmh HTN, + knee tear x 2 months sent in by ortho for knee aspirate to r/o septic joint prior to knee surgery. pt denies fever, worsening pain, erythema. knee currently in brace. ortho: dr brock    ROS: No fever/chills. No photophobia/eye pain/changes in vision, No ear pain/sore throat/dysphagia, No chest pain/palpitations. No SOB/cough. No abdominal pain, No N/V/D, no black/bloody bm. No dysuria/frequency/discharge, No headache. No Dizziness.  No rash.  No numbness/tingling/weakness. 53yo female with pmh HTN, + knee tear x 2 months sent in by ortho for knee aspirate to r/o septic joint prior to knee surgery as pt had routine labs with pmd last week that noted elev wcc of 12 and esr. pt denies fever, worsening pain, erythema. knee currently in brace. ortho: dr brock    ROS: No fever/chills. No photophobia/eye pain/changes in vision, No ear pain/sore throat/dysphagia, No chest pain/palpitations. No SOB/cough. No abdominal pain, No N/V/D, no black/bloody bm. No dysuria/frequency/discharge, No headache. No Dizziness.  No rash.  No numbness/tingling/weakness.

## 2019-10-29 NOTE — ED ADULT NURSE NOTE - NSIMPLEMENTINTERV_GEN_ALL_ED
Implemented All Fall Risk Interventions:  [place] to call system. Call bell, personal items and telephone within reach. Instruct patient to call for assistance. Room bathroom lighting operational. Non-slip footwear when patient is off stretcher. Physically safe environment: no spills, clutter or unnecessary equipment. Stretcher in lowest position, wheels locked, appropriate side rails in place. Provide visual cue, wrist band, yellow gown, etc. Monitor gait and stability. Monitor for mental status changes and reorient to person, place, and time. Review medications for side effects contributing to fall risk. Reinforce activity limits and safety measures with patient and family.

## 2019-10-29 NOTE — CONSULT NOTE ADULT - SUBJECTIVE AND OBJECTIVE BOX
Orthopaedic Surgery Consult Note    Pt is a 54y female presenting with L knee pain and swelling that has been present for months. Pt is scheduled to undergo knee arthroscopy with Dr. Whaley but he was concerned for possible septic joint when he saw her in clinic and wanted to have the knee aspirated before she was booked for surgery. Pt is a community ambulator at baseline. Pt presents wearing a knee brace on L knee that she uses for pain relief. Pt denies numbness, tingling, or paresthesias in affected limb. Pt denies recent trauma and denies any other orthopaedic injuries at this time. Pt denies pain or involvement of any other joint. Denies fevers, recent illness, dizziness, CP, SOB, N/V, calf pain.    PMHx:  R/O LEFT SEPTIC KNEE  DVT (deep venous thrombosis)  Herniated lumbar intervertebral disc  Arthritis  B12 deficiency  Hypothyroid  Depression  Anxiety  HTN (hypertension)  Asthma  S/P lumbar spinal fusion  S/P hip replacement, right  S/P shoulder surgery  R/O LEFT SEPTIC KNEE  12    PSHx:    Allergies:  penicillin (Other)    Medications:  lactated ringers. 1000 milliLiter(s) IV Continuous <Continuous>  oxyCODONE  ER Tablet 20 milliGRAM(s) Oral once    Social: + illicit drug use    Labs:              Imaging:  XR L knee - mild effusion, no fracture/dislocation    Vitals:    Physical Exam:  Gen: NAD, AAOx3    LLE:  Skin intact  +Effusion  No edema, erythema, ecchymosis  Mild warmth to touch  No gross deformity  TTP about knee diffusely  No bony TTP of Hip/Foot/Toes  ROM at knee 0-120 degrees with mild pain  Tender with terminal ROM of knee  Non-tender with micromotion of knee  Non-tender with logroll  +EHL/FHL/TA/GS  SILT L2-S1  +DP/PT Pulses  Compartments soft and compressible  No calf TTP B/L    Procedure:  Discussed risks, benefits, and alternatives to procedure with patient at bedside. Pt expressed understanding and all questions were answered. Under sterile technique, L knee was prepped and 35cc of serosanguinous fluid was aspirated through lateral suprapatellar approach. Samples of fluid were sent for gram stain, culture, crystals, and cell count. Patient tolerated procedure well. No complications. Pt was placed in a compressive dressing following procedure.     A/P: Pt is a 54y female with L knee pain and effusion.    PATIENT SIGNED OUT AMA AFTER JOINT ASPIRATION STATING SHE DID NOT WANT TO WAIT FOR LAB RESULTS    -Afebrile  -Blood work not drawn  -Cannot exclude septic arthritis at this time  -35cc serosanguinous fluid aspirated at bedside  -FU Gram stain/culture/crystals/cell count  -NPO except meds and hold all chemical dvt prophylaxis until confirmed that knee is not septic  -SCDs  -WBAT LLE  -PT/OT  -Med management  -Further need for operative intervention will be dictated by aspiration results.  -Advised patient against leaving before lab results; patient agitated and stated she did not want to wait  -Discussed possible need for surgical intervention with patient  -Discussed with Dr. Whaley who is aware patient left    Gino Galo D.O.  PGY-1 Orthopaedic Surgery

## 2019-10-29 NOTE — ED ADULT TRIAGE NOTE - CHIEF COMPLAINT QUOTE
as per pt " I was sent by my MD Whaley to rule out fluid in my left knee before the surgery can be scheduled." hx knee injury ( torn meniscus, 2 knee tares." hx htn on cozaar.

## 2019-10-29 NOTE — ED PROVIDER NOTE - PHYSICAL EXAMINATION
Gen: Alert, Well appearing. NAD    Head: NC, AT, PERRL, normal lids/conjunctiva   ENT: Bilateral TM WNL, patent oropharynx without erythema/exudate, uvula midline  Neck: supple, no tenderness/meningismus  Pulm: Bilateral clear BS, normal resp effort  CV: RRR, no M/R/G, +dist pulses   Abd: soft, NT/ND, +BS, no guarding/rebound tenderness  Mskel: + left knee brace  Skin: no rash, no bruising  Neuro: AAOx3, no sensory/motor deficits

## 2019-10-29 NOTE — ED ADULT NURSE NOTE - OBJECTIVE STATEMENT
pt A&Ox4 states I was sent by my MD Whaley to rule out fluid in my left knee before the surgery can be scheduled." hx knee injury ( torn meniscus)."  PMH HTN. Denies fevers and redness.

## 2019-10-29 NOTE — ED PROVIDER NOTE - PATIENT PORTAL LINK FT
You can access the FollowMyHealth Patient Portal offered by Health system by registering at the following website: [email or web address]. By joining [email or web address]’s FollowMyHealth portal, you will also be able to view your health information using other applications (apps) compatible with our system.

## 2019-11-12 LAB
CULTURE RESULTS: SIGNIFICANT CHANGE UP
GRAM STN FLD: SIGNIFICANT CHANGE UP
SPECIMEN SOURCE: SIGNIFICANT CHANGE UP

## 2020-01-23 ENCOUNTER — TRANSCRIPTION ENCOUNTER (OUTPATIENT)
Age: 55
End: 2020-01-23

## 2020-01-24 ENCOUNTER — OUTPATIENT (OUTPATIENT)
Dept: OUTPATIENT SERVICES | Facility: HOSPITAL | Age: 55
LOS: 1 days | Discharge: HOME | End: 2020-01-24

## 2020-01-24 VITALS
DIASTOLIC BLOOD PRESSURE: 45 MMHG | RESPIRATION RATE: 17 BRPM | SYSTOLIC BLOOD PRESSURE: 85 MMHG | OXYGEN SATURATION: 98 % | HEART RATE: 78 BPM | TEMPERATURE: 99 F

## 2020-01-24 VITALS
WEIGHT: 177.91 LBS | SYSTOLIC BLOOD PRESSURE: 81 MMHG | TEMPERATURE: 99 F | RESPIRATION RATE: 15 BRPM | HEIGHT: 65 IN | HEART RATE: 84 BPM | OXYGEN SATURATION: 98 % | DIASTOLIC BLOOD PRESSURE: 46 MMHG

## 2020-01-24 DIAGNOSIS — Z96.641 PRESENCE OF RIGHT ARTIFICIAL HIP JOINT: Chronic | ICD-10-CM

## 2020-01-24 DIAGNOSIS — Z98.890 OTHER SPECIFIED POSTPROCEDURAL STATES: Chronic | ICD-10-CM

## 2020-01-24 DIAGNOSIS — Z98.1 ARTHRODESIS STATUS: Chronic | ICD-10-CM

## 2020-01-24 DIAGNOSIS — R09.89 OTHER SPECIFIED SYMPTOMS AND SIGNS INVOLVING THE CIRCULATORY AND RESPIRATORY SYSTEMS: ICD-10-CM

## 2020-01-24 LAB — HCG UR QL: NEGATIVE — SIGNIFICANT CHANGE UP

## 2020-01-24 RX ORDER — HYDROMORPHONE HYDROCHLORIDE 2 MG/ML
1 INJECTION INTRAMUSCULAR; INTRAVENOUS; SUBCUTANEOUS
Refills: 0 | Status: DISCONTINUED | OUTPATIENT
Start: 2020-01-24 | End: 2020-01-24

## 2020-01-24 RX ORDER — ATENOLOL AND CHLORTHALIDONE 50; 25 MG/1; MG/1
1 TABLET ORAL
Qty: 0 | Refills: 0 | DISCHARGE

## 2020-01-24 RX ORDER — METOCLOPRAMIDE HCL 10 MG
10 TABLET ORAL ONCE
Refills: 0 | Status: DISCONTINUED | OUTPATIENT
Start: 2020-01-24 | End: 2020-01-24

## 2020-01-24 RX ORDER — ACETAMINOPHEN 500 MG
1000 TABLET ORAL ONCE
Refills: 0 | Status: COMPLETED | OUTPATIENT
Start: 2020-01-24 | End: 2020-01-24

## 2020-01-24 RX ORDER — LEVOTHYROXINE SODIUM 125 MCG
188 TABLET ORAL
Qty: 0 | Refills: 0 | DISCHARGE

## 2020-01-24 RX ORDER — OXYCODONE HYDROCHLORIDE 5 MG/1
1 TABLET ORAL
Qty: 40 | Refills: 0
Start: 2020-01-24

## 2020-01-24 RX ORDER — SODIUM CHLORIDE 9 MG/ML
1000 INJECTION, SOLUTION INTRAVENOUS
Refills: 0 | Status: DISCONTINUED | OUTPATIENT
Start: 2020-01-24 | End: 2020-01-24

## 2020-01-24 RX ORDER — HYDROMORPHONE HYDROCHLORIDE 2 MG/ML
0.5 INJECTION INTRAMUSCULAR; INTRAVENOUS; SUBCUTANEOUS
Refills: 0 | Status: DISCONTINUED | OUTPATIENT
Start: 2020-01-24 | End: 2020-01-24

## 2020-01-24 RX ADMIN — SODIUM CHLORIDE 75 MILLILITER(S): 9 INJECTION, SOLUTION INTRAVENOUS at 09:11

## 2020-01-24 RX ADMIN — HYDROMORPHONE HYDROCHLORIDE 1 MILLIGRAM(S): 2 INJECTION INTRAMUSCULAR; INTRAVENOUS; SUBCUTANEOUS at 09:21

## 2020-01-24 RX ADMIN — HYDROMORPHONE HYDROCHLORIDE 0.5 MILLIGRAM(S): 2 INJECTION INTRAMUSCULAR; INTRAVENOUS; SUBCUTANEOUS at 08:32

## 2020-01-24 RX ADMIN — HYDROMORPHONE HYDROCHLORIDE 0.5 MILLIGRAM(S): 2 INJECTION INTRAMUSCULAR; INTRAVENOUS; SUBCUTANEOUS at 09:34

## 2020-01-24 RX ADMIN — Medication 400 MILLIGRAM(S): at 09:11

## 2020-01-24 RX ADMIN — Medication 1000 MILLIGRAM(S): at 09:21

## 2020-01-24 RX ADMIN — HYDROMORPHONE HYDROCHLORIDE 1 MILLIGRAM(S): 2 INJECTION INTRAMUSCULAR; INTRAVENOUS; SUBCUTANEOUS at 09:10

## 2020-01-24 NOTE — H&P ADULT - ASSESSMENT
A/P:  55 yo F w/ possible mensical tears of Right knee:  -plan for Knee arthroscopy 1/24 with Dr. Whaley  -NPO  -pain control  -FU in office in 7-10 days  -Dr. Whaley aware and in agreement with above plan

## 2020-01-24 NOTE — ASU DISCHARGE PLAN (ADULT/PEDIATRIC) - CALL YOUR DOCTOR IF YOU HAVE ANY OF THE FOLLOWING:
Pain not relieved by Medications/Numbness, tingling, color or temperature change to extremity/Wound/Surgical Site with redness, or foul smelling discharge or pus/Bleeding that does not stop

## 2020-01-24 NOTE — H&P ADULT - NSHPPHYSICALEXAM_GEN_ALL_CORE
Vital Signs Last 24 Hrs  T(C): --  T(F): --  HR: --  BP: --  BP(mean): --  RR: --  SpO2: --    PE:  Gen: NAD  RLE:  Skin intact  +ttp with ROM of knee  +EHL/FHL/Gsc/TA  SILT L2-S1  2+ DP

## 2020-01-24 NOTE — ASU DISCHARGE PLAN (ADULT/PEDIATRIC) - ASU DC SPECIAL INSTRUCTIONSFT
Follow up with Dr Whaley in 2 weeks. Call office for appointment. Take medications as prescribed. Weight bear as tolerated. Keep dressing clean, dry, and intact. Rest, ice, and elevate affected extremity. Can replace dressing in 72 hours. Place adhesive bandage over incision sites. Keep incision dry in shower for 1 week, then may let water run over sutures but do not soak incision sites.

## 2020-01-24 NOTE — H&P ADULT - HISTORY OF PRESENT ILLNESS
HPI:  55 yo F w/ left knee internal derangement, plan for knee arthroscopy with Dr. Whaley. No fevers, chills, sob, cp, n/v.

## 2020-01-27 DIAGNOSIS — M23.222 DERANGEMENT OF POSTERIOR HORN OF MEDIAL MENISCUS DUE TO OLD TEAR OR INJURY, LEFT KNEE: ICD-10-CM

## 2020-01-27 DIAGNOSIS — Z86.718 PERSONAL HISTORY OF OTHER VENOUS THROMBOSIS AND EMBOLISM: ICD-10-CM

## 2020-01-27 DIAGNOSIS — Z72.0 TOBACCO USE: ICD-10-CM

## 2020-01-27 DIAGNOSIS — M67.52 PLICA SYNDROME, LEFT KNEE: ICD-10-CM

## 2020-01-27 DIAGNOSIS — M65.9 SYNOVITIS AND TENOSYNOVITIS, UNSPECIFIED: ICD-10-CM

## 2020-01-27 DIAGNOSIS — M23.252 DERANGEMENT OF POSTERIOR HORN OF LATERAL MENISCUS DUE TO OLD TEAR OR INJURY, LEFT KNEE: ICD-10-CM

## 2020-01-27 DIAGNOSIS — F41.8 OTHER SPECIFIED ANXIETY DISORDERS: ICD-10-CM

## 2020-01-27 DIAGNOSIS — Z88.0 ALLERGY STATUS TO PENICILLIN: ICD-10-CM

## 2020-01-27 DIAGNOSIS — M17.12 UNILATERAL PRIMARY OSTEOARTHRITIS, LEFT KNEE: ICD-10-CM

## 2020-01-27 DIAGNOSIS — E03.9 HYPOTHYROIDISM, UNSPECIFIED: ICD-10-CM

## 2021-11-18 NOTE — ASU PATIENT PROFILE, ADULT - NUMBER OF YRS
Patient presented after waiting 30 minutes with no reaction to allergy injections. Discharged from clinic.    Arely Bliss RN    
None
42

## 2022-08-20 NOTE — ASU PATIENT PROFILE, ADULT - TOBACCO USE
Pt continues to rest quietly and no further changes noted in exam. Vitals and SpO2 stable on 6 lpm. All lines and monitoring devices remain in place. Pt repositioned in bed. Restraints have been D/C ed as pt is not attempting to pull at lines or monitoring devices. Continue current plan of care.  Gloria Douglas RN Current every day smoker

## 2023-04-27 NOTE — ASU PREOP CHECKLIST - HEIGHT IN FEET
5 Peng Advancement Flap Text: The defect edges were debeveled with a #15 scalpel blade.  Given the location of the defect, shape of the defect and the proximity to free margins a Peng advancement flap was deemed most appropriate.  Using a sterile surgical marker, an appropriate advancement flap was drawn incorporating the defect and placing the expected incisions within the relaxed skin tension lines where possible. The area thus outlined was incised deep to adipose tissue with a #15 scalpel blade.  The skin margins were undermined to an appropriate distance in all directions utilizing iris scissors.

## 2023-07-31 NOTE — ASU DISCHARGE PLAN (ADULT/PEDIATRIC) - ACCOMPANIED BY
Patient arrived to unit. Patient family friend brought home medication, Nintedanib and Prilosec, and nurse brought it down to pharmacy for non-formulary label to be made. Patient states she wants her medication left at her bedside and not in patient's bin. Medications left at bedside.   Family

## 2023-11-06 NOTE — PHYSICAL THERAPY INITIAL EVALUATION ADULT - STANDING BALANCE: STATIC
with rolling walker/fair plus Winle[place] Counseling:  I discussed with the patient the risks of topical clascoterone including but not limited to erythema, scaling, itching, and stinging. Patient voiced their understanding.

## 2025-03-06 NOTE — ASU PREOP CHECKLIST - STERILIZATION AFFIRMATION
No protocol for requested medication.    Medication: HYDROcodone-acetaminophen (NORCO)  MG per tablet   Last office visit date: 2-13-25  Pharmacy: [id number] [place], [place] - [address] AT [facility] [address]    Order pended, routed to clinician for review.    ucg neg